# Patient Record
Sex: MALE | Race: WHITE | Employment: OTHER
[De-identification: names, ages, dates, MRNs, and addresses within clinical notes are randomized per-mention and may not be internally consistent; named-entity substitution may affect disease eponyms.]

---

## 2017-01-06 RX ORDER — RABEPRAZOLE SODIUM 20 MG/1
TABLET, DELAYED RELEASE ORAL
Qty: 90 TABLET | Refills: 1 | Status: SHIPPED | OUTPATIENT
Start: 2017-01-06 | End: 2017-07-05 | Stop reason: SDUPTHER

## 2017-02-22 ENCOUNTER — OFFICE VISIT (OUTPATIENT)
Dept: INTERNAL MEDICINE | Facility: CLINIC | Age: 82
End: 2017-02-22

## 2017-02-22 VITALS
TEMPERATURE: 97.5 F | SYSTOLIC BLOOD PRESSURE: 126 MMHG | WEIGHT: 180 LBS | HEIGHT: 72 IN | RESPIRATION RATE: 20 BRPM | DIASTOLIC BLOOD PRESSURE: 62 MMHG | BODY MASS INDEX: 24.38 KG/M2 | HEART RATE: 92 BPM

## 2017-02-22 DIAGNOSIS — J34.89 DRY NOSE: ICD-10-CM

## 2017-02-22 DIAGNOSIS — M54.2 NECK PAIN: Primary | ICD-10-CM

## 2017-02-22 PROCEDURE — 99213 OFFICE O/P EST LOW 20 MIN: CPT | Performed by: INTERNAL MEDICINE

## 2017-02-22 RX ORDER — ECHINACEA PURPUREA EXTRACT 125 MG
1 TABLET ORAL 4 TIMES DAILY
Qty: 1 BOTTLE | Refills: 0 | Status: SHIPPED | OUTPATIENT
Start: 2017-02-22 | End: 2017-05-11 | Stop reason: CLARIF

## 2017-02-22 RX ORDER — PREDNISONE 10 MG/1
10 TABLET ORAL 2 TIMES DAILY
Qty: 10 TABLET | Refills: 0 | Status: SHIPPED | OUTPATIENT
Start: 2017-02-22 | End: 2017-02-27

## 2017-03-06 ENCOUNTER — TELEPHONE (OUTPATIENT)
Dept: INTERNAL MEDICINE | Facility: CLINIC | Age: 82
End: 2017-03-06

## 2017-03-06 RX ORDER — PREDNISONE 10 MG/1
10 TABLET ORAL 3 TIMES DAILY
Qty: 15 TABLET | Refills: 0 | Status: SHIPPED | OUTPATIENT
Start: 2017-03-06 | End: 2017-03-11

## 2017-04-10 RX ORDER — PREDNISONE 10 MG/1
10 TABLET ORAL 3 TIMES DAILY
Qty: 15 TABLET | Refills: 0 | Status: SHIPPED | OUTPATIENT
Start: 2017-04-10 | End: 2017-04-15

## 2017-04-26 ENCOUNTER — TELEPHONE (OUTPATIENT)
Dept: INTERNAL MEDICINE CLINIC | Age: 82
End: 2017-04-26

## 2017-04-26 RX ORDER — AZITHROMYCIN 250 MG/1
TABLET, FILM COATED ORAL
Qty: 1 PACKET | Refills: 0 | Status: SHIPPED | OUTPATIENT
Start: 2017-04-26 | End: 2017-05-06

## 2017-04-26 RX ORDER — LORATADINE 10 MG/1
10 TABLET ORAL DAILY
Qty: 30 TABLET | Refills: 0 | Status: SHIPPED | OUTPATIENT
Start: 2017-04-26 | End: 2017-05-11 | Stop reason: CLARIF

## 2017-05-11 ENCOUNTER — OFFICE VISIT (OUTPATIENT)
Dept: INTERNAL MEDICINE CLINIC | Age: 82
End: 2017-05-11
Payer: COMMERCIAL

## 2017-05-11 VITALS
WEIGHT: 179.4 LBS | HEIGHT: 72 IN | RESPIRATION RATE: 16 BRPM | TEMPERATURE: 97.4 F | DIASTOLIC BLOOD PRESSURE: 64 MMHG | BODY MASS INDEX: 24.3 KG/M2 | SYSTOLIC BLOOD PRESSURE: 132 MMHG | HEART RATE: 84 BPM

## 2017-05-11 DIAGNOSIS — E78.00 HIGH CHOLESTEROL: ICD-10-CM

## 2017-05-11 DIAGNOSIS — I10 ESSENTIAL HYPERTENSION: Primary | ICD-10-CM

## 2017-05-11 DIAGNOSIS — E03.9 HYPOTHYROIDISM, UNSPECIFIED TYPE: ICD-10-CM

## 2017-05-11 PROCEDURE — 99214 OFFICE O/P EST MOD 30 MIN: CPT | Performed by: INTERNAL MEDICINE

## 2017-05-15 RX ORDER — LEVOTHYROXINE SODIUM 0.12 MG/1
TABLET ORAL
Qty: 90 TABLET | Refills: 0 | Status: SHIPPED | OUTPATIENT
Start: 2017-05-15 | End: 2017-08-13 | Stop reason: SDUPTHER

## 2017-05-16 ENCOUNTER — TELEPHONE (OUTPATIENT)
Dept: INTERNAL MEDICINE CLINIC | Age: 82
End: 2017-05-16

## 2017-05-16 DIAGNOSIS — E04.9 ENLARGED THYROID: Primary | ICD-10-CM

## 2017-05-19 RX ORDER — PRAVASTATIN SODIUM 20 MG
TABLET ORAL
Qty: 90 TABLET | Refills: 0 | Status: SHIPPED | OUTPATIENT
Start: 2017-05-19 | End: 2017-08-20 | Stop reason: SDUPTHER

## 2017-05-24 LAB
CHOLESTEROL, TOTAL: 181 MG/DL
CHOLESTEROL/HDL RATIO: 3.6
HDLC SERPL-MCNC: 50 MG/DL (ref 35–70)
LDL CHOLESTEROL CALCULATED: 107 MG/DL (ref 0–160)
TRIGL SERPL-MCNC: 119 MG/DL
VLDLC SERPL CALC-MCNC: 24 MG/DL

## 2017-05-25 DIAGNOSIS — E03.9 HYPOTHYROIDISM, UNSPECIFIED TYPE: ICD-10-CM

## 2017-05-25 DIAGNOSIS — I10 ESSENTIAL HYPERTENSION: ICD-10-CM

## 2017-05-25 DIAGNOSIS — E78.00 HIGH CHOLESTEROL: ICD-10-CM

## 2017-06-29 ENCOUNTER — TELEPHONE (OUTPATIENT)
Dept: INTERNAL MEDICINE CLINIC | Age: 82
End: 2017-06-29

## 2017-07-05 RX ORDER — RANITIDINE 150 MG/1
TABLET ORAL
Qty: 90 TABLET | Refills: 0 | Status: SHIPPED | OUTPATIENT
Start: 2017-07-05 | End: 2017-10-03 | Stop reason: SDUPTHER

## 2017-07-05 RX ORDER — RABEPRAZOLE SODIUM 20 MG/1
TABLET, DELAYED RELEASE ORAL
Qty: 90 TABLET | Refills: 0 | Status: SHIPPED | OUTPATIENT
Start: 2017-07-05 | End: 2017-10-03 | Stop reason: SDUPTHER

## 2017-08-01 ENCOUNTER — OFFICE VISIT (OUTPATIENT)
Dept: INTERNAL MEDICINE CLINIC | Age: 82
End: 2017-08-01
Payer: COMMERCIAL

## 2017-08-01 VITALS
BODY MASS INDEX: 23.35 KG/M2 | SYSTOLIC BLOOD PRESSURE: 137 MMHG | RESPIRATION RATE: 16 BRPM | HEART RATE: 100 BPM | DIASTOLIC BLOOD PRESSURE: 72 MMHG | WEIGHT: 172.4 LBS | HEIGHT: 72 IN | TEMPERATURE: 97.4 F

## 2017-08-01 DIAGNOSIS — E04.1 THYROID NODULE: ICD-10-CM

## 2017-08-01 DIAGNOSIS — G89.29 CHRONIC BILATERAL LOW BACK PAIN WITHOUT SCIATICA: ICD-10-CM

## 2017-08-01 DIAGNOSIS — M54.50 CHRONIC BILATERAL LOW BACK PAIN WITHOUT SCIATICA: ICD-10-CM

## 2017-08-01 DIAGNOSIS — E03.9 HYPOTHYROIDISM, UNSPECIFIED TYPE: ICD-10-CM

## 2017-08-01 DIAGNOSIS — Z00.00 ROUTINE PHYSICAL EXAMINATION: Primary | ICD-10-CM

## 2017-08-01 PROCEDURE — G0439 PPPS, SUBSEQ VISIT: HCPCS | Performed by: INTERNAL MEDICINE

## 2017-08-01 ASSESSMENT — LIFESTYLE VARIABLES
HAVE YOU OR SOMEONE ELSE BEEN INJURED AS A RESULT OF YOUR DRINKING: 0
HOW OFTEN DURING THE LAST YEAR HAVE YOU BEEN UNABLE TO REMEMBER WHAT HAPPENED THE NIGHT BEFORE BECAUSE YOU HAD BEEN DRINKING: 0
HAS A RELATIVE, FRIEND, DOCTOR, OR ANOTHER HEALTH PROFESSIONAL EXPRESSED CONCERN ABOUT YOUR DRINKING OR SUGGESTED YOU CUT DOWN: 0
AUDIT-C TOTAL SCORE: 1
AUDIT TOTAL SCORE: 1
HOW OFTEN DO YOU HAVE A DRINK CONTAINING ALCOHOL: 1
HOW OFTEN DURING THE LAST YEAR HAVE YOU FAILED TO DO WHAT WAS NORMALLY EXPECTED FROM YOU BECAUSE OF DRINKING: 0
HOW MANY STANDARD DRINKS CONTAINING ALCOHOL DO YOU HAVE ON A TYPICAL DAY: 0
HOW OFTEN DURING THE LAST YEAR HAVE YOU FOUND THAT YOU WERE NOT ABLE TO STOP DRINKING ONCE YOU HAD STARTED: 0
HOW OFTEN DO YOU HAVE SIX OR MORE DRINKS ON ONE OCCASION: 0
HOW OFTEN DURING THE LAST YEAR HAVE YOU HAD A FEELING OF GUILT OR REMORSE AFTER DRINKING: 0
HOW OFTEN DURING THE LAST YEAR HAVE YOU NEEDED AN ALCOHOLIC DRINK FIRST THING IN THE MORNING TO GET YOURSELF GOING AFTER A NIGHT OF HEAVY DRINKING: 0

## 2017-08-01 ASSESSMENT — PATIENT HEALTH QUESTIONNAIRE - PHQ9: SUM OF ALL RESPONSES TO PHQ QUESTIONS 1-9: 0

## 2017-08-01 ASSESSMENT — ANXIETY QUESTIONNAIRES: GAD7 TOTAL SCORE: 0

## 2017-08-04 LAB
CHOLESTEROL, TOTAL: 182 MG/DL
CHOLESTEROL/HDL RATIO: 4
HDLC SERPL-MCNC: 45 MG/DL (ref 35–70)
LDL CHOLESTEROL CALCULATED: 93 MG/DL (ref 0–160)
T4 FREE: 1.4
TRIGL SERPL-MCNC: 220 MG/DL
TSH SERPL DL<=0.05 MIU/L-ACNC: 0.18 UIU/ML
VLDLC SERPL CALC-MCNC: 44 MG/DL

## 2017-08-14 RX ORDER — LEVOTHYROXINE SODIUM 0.12 MG/1
TABLET ORAL
Qty: 90 TABLET | Refills: 1 | Status: SHIPPED | OUTPATIENT
Start: 2017-08-14 | End: 2018-05-30 | Stop reason: CLARIF

## 2017-08-15 DIAGNOSIS — E03.9 HYPOTHYROIDISM, UNSPECIFIED TYPE: ICD-10-CM

## 2017-08-15 DIAGNOSIS — E04.1 THYROID NODULE: ICD-10-CM

## 2017-08-15 DIAGNOSIS — G89.29 CHRONIC BILATERAL LOW BACK PAIN WITHOUT SCIATICA: ICD-10-CM

## 2017-08-15 DIAGNOSIS — Z00.00 ROUTINE PHYSICAL EXAMINATION: ICD-10-CM

## 2017-08-15 DIAGNOSIS — M54.50 CHRONIC BILATERAL LOW BACK PAIN WITHOUT SCIATICA: ICD-10-CM

## 2017-08-16 ENCOUNTER — TELEPHONE (OUTPATIENT)
Dept: INTERNAL MEDICINE CLINIC | Age: 82
End: 2017-08-16

## 2017-08-16 DIAGNOSIS — E03.9 HYPOTHYROIDISM, UNSPECIFIED TYPE: Primary | ICD-10-CM

## 2017-08-21 RX ORDER — PRAVASTATIN SODIUM 20 MG
TABLET ORAL
Qty: 90 TABLET | Refills: 1 | Status: SHIPPED | OUTPATIENT
Start: 2017-08-21 | End: 2018-02-17 | Stop reason: SDUPTHER

## 2017-10-03 RX ORDER — RABEPRAZOLE SODIUM 20 MG/1
TABLET, DELAYED RELEASE ORAL
Qty: 90 TABLET | Refills: 1 | Status: SHIPPED | OUTPATIENT
Start: 2017-10-03 | End: 2018-04-01 | Stop reason: SDUPTHER

## 2017-10-03 RX ORDER — RANITIDINE 150 MG/1
TABLET ORAL
Qty: 90 TABLET | Refills: 1 | Status: SHIPPED | OUTPATIENT
Start: 2017-10-03 | End: 2018-04-01 | Stop reason: SDUPTHER

## 2017-11-16 ENCOUNTER — OFFICE VISIT (OUTPATIENT)
Dept: INTERNAL MEDICINE CLINIC | Age: 82
End: 2017-11-16
Payer: COMMERCIAL

## 2017-11-16 VITALS
SYSTOLIC BLOOD PRESSURE: 128 MMHG | DIASTOLIC BLOOD PRESSURE: 74 MMHG | WEIGHT: 175.6 LBS | TEMPERATURE: 98.6 F | OXYGEN SATURATION: 97 % | HEIGHT: 72 IN | BODY MASS INDEX: 23.78 KG/M2 | HEART RATE: 102 BPM | RESPIRATION RATE: 14 BRPM

## 2017-11-16 DIAGNOSIS — I10 ESSENTIAL HYPERTENSION: ICD-10-CM

## 2017-11-16 DIAGNOSIS — M54.50 CHRONIC BILATERAL LOW BACK PAIN WITHOUT SCIATICA: ICD-10-CM

## 2017-11-16 DIAGNOSIS — G89.29 CHRONIC BILATERAL LOW BACK PAIN WITHOUT SCIATICA: ICD-10-CM

## 2017-11-16 DIAGNOSIS — E03.9 HYPOTHYROIDISM, UNSPECIFIED TYPE: Primary | ICD-10-CM

## 2017-11-16 PROCEDURE — 99214 OFFICE O/P EST MOD 30 MIN: CPT | Performed by: INTERNAL MEDICINE

## 2017-11-16 NOTE — PROGRESS NOTES
Past Surgical History:   Procedure Laterality Date    BACK SURGERY      HERNIA REPAIR      JOINT REPLACEMENT Bilateral knees    SHOULDER SURGERY Right     THYROIDECTOMY      ULNAR TUNNEL RELEASE      UPPER GASTROINTESTINAL ENDOSCOPY  7/11/14       Family History   Problem Relation Age of Onset    Cancer Father      ROS   Constitutional:  Negative for fatigue, loss of appetite and unexpected weight change   HEENT            : Negative for neck stiffness and pain, no congestion or sinus pressure   Eyes                : No visual disturbance or pain   Cardiovascular: No chest pain or palpitations or leg swelling   Respiratory      : Negative for cough, shortness of breath or wheezing   Gastrointestinal: Negative for abdominal pain, constipation or diarrhea and bloating No nausea or vomiting   Genitourinary:     No urgency or frequency, no burning or hematuria   Musculoskeletal: Positive for arthralgias, back pain or myalgias   Skin                  : Negative for rash or erythema   Neurological    : Negative for dizziness, weakness, tremors ,light headedness or syncope   Psychiatric       : Negative for dysphoric mood, sleep disturbances, nervous or anxious, or decreased concentration   All other review of systems was negative    Objective  Physical Examination:    Nursing note reviewed    /74 (Site: Left Arm, Position: Sitting, Cuff Size: Medium Adult)   Pulse 102   Temp 98.6 °F (37 °C) (Oral)   Resp 14   Ht 6' 0.01\" (1.829 m)   Wt 175 lb 9.6 oz (79.7 kg)   SpO2 97%   BMI 23.81 kg/m²   BP Readings from Last 3 Encounters:   11/16/17 128/74   08/01/17 137/72   05/11/17 132/64         Constitutional:  Lakshmi Alberto is oriented to place, person and time ,appears well-developed and well-nourished  HEENT:  Atraumatic and normocephalic, external ears normal bilaterally, nose normal no oropharyngeal exudate and is clear and moist  Eyes:  EOCM normal; conjunctivae normal; PERRLA patient's Synthroid was decreased to 100 µg from 125 µg and will be getting labs done at the end of December  Blood pressure stable on current medications and patient is seen cardiology for aortic stenosis and continue current management conservatively and patient is also seeing in nephrology for his chronic renal failure and urology for his prostate carcinoma in our office notes reviewed  Review in 6 months           1. Severa Ingle received counseling on the following healthy behaviors: nutrition and exercise    2. Prior labs and health maintenance reviewed. 3.  Discussed use, benefit, and side effects of prescribed medications. Barriers to medication compliance addressed. All his questions were answered. Pt voiced understanding. Severa Ingle will continue current medications, diet and exercise. No orders of the defined types were placed in this encounter. Completed Refills               Requested Prescriptions      No prescriptions requested or ordered in this encounter     4. Patient given educational materials - see patient instructions    5. Was a self-tracking handout given in paper form or via [employer]? NO    No orders of the defined types were placed in this encounter. Return in about 6 months (around 5/16/2018). Patient voiced understanding and agreed to treatment plan. Electronically signed by Anabela Hearn MD on 11/16/2017 at 3:12 PM    This note is created with a voice recognition program and while intend to generate a document that accurately reflects the content of the visit, no guarantee can be provided that every mistake has been identified and corrected by editing.

## 2017-11-27 RX ORDER — MELOXICAM 15 MG/1
TABLET ORAL
Qty: 90 TABLET | Refills: 1 | Status: SHIPPED | OUTPATIENT
Start: 2017-11-27 | End: 2018-05-30 | Stop reason: SDUPTHER

## 2018-02-02 ENCOUNTER — TELEPHONE (OUTPATIENT)
Dept: INTERNAL MEDICINE CLINIC | Age: 83
End: 2018-02-02

## 2018-02-19 RX ORDER — PRAVASTATIN SODIUM 20 MG
TABLET ORAL
Qty: 90 TABLET | Refills: 1 | Status: SHIPPED | OUTPATIENT
Start: 2018-02-19 | End: 2018-08-16 | Stop reason: SDUPTHER

## 2018-04-03 RX ORDER — RABEPRAZOLE SODIUM 20 MG/1
TABLET, DELAYED RELEASE ORAL
Qty: 90 TABLET | Refills: 0 | Status: SHIPPED | OUTPATIENT
Start: 2018-04-03 | End: 2018-07-02 | Stop reason: SDUPTHER

## 2018-04-03 RX ORDER — RANITIDINE 150 MG/1
TABLET ORAL
Qty: 90 TABLET | Refills: 0 | Status: SHIPPED | OUTPATIENT
Start: 2018-04-03 | End: 2018-07-02 | Stop reason: SDUPTHER

## 2018-05-30 ENCOUNTER — OFFICE VISIT (OUTPATIENT)
Dept: INTERNAL MEDICINE CLINIC | Age: 83
End: 2018-05-30
Payer: COMMERCIAL

## 2018-05-30 VITALS
BODY MASS INDEX: 23.16 KG/M2 | TEMPERATURE: 98.5 F | WEIGHT: 170.8 LBS | SYSTOLIC BLOOD PRESSURE: 122 MMHG | DIASTOLIC BLOOD PRESSURE: 80 MMHG | HEART RATE: 84 BPM

## 2018-05-30 DIAGNOSIS — G89.29 CHRONIC BILATERAL LOW BACK PAIN WITHOUT SCIATICA: ICD-10-CM

## 2018-05-30 DIAGNOSIS — E78.00 HIGH CHOLESTEROL: ICD-10-CM

## 2018-05-30 DIAGNOSIS — I10 ESSENTIAL HYPERTENSION: Primary | ICD-10-CM

## 2018-05-30 DIAGNOSIS — M54.50 CHRONIC BILATERAL LOW BACK PAIN WITHOUT SCIATICA: ICD-10-CM

## 2018-05-30 PROCEDURE — 99214 OFFICE O/P EST MOD 30 MIN: CPT | Performed by: INTERNAL MEDICINE

## 2018-05-30 RX ORDER — FUROSEMIDE 20 MG/1
1 TABLET ORAL DAILY
COMMUNITY
Start: 2018-04-06 | End: 2019-09-17

## 2018-05-30 RX ORDER — TRAMADOL HYDROCHLORIDE 50 MG/1
1 TABLET ORAL EVERY 8 HOURS PRN
COMMUNITY
Start: 2018-04-27

## 2018-05-30 RX ORDER — MELOXICAM 15 MG/1
TABLET ORAL
Qty: 90 TABLET | Refills: 1 | Status: ON HOLD | OUTPATIENT
Start: 2018-05-30 | End: 2019-09-17 | Stop reason: ALTCHOICE

## 2018-05-30 RX ORDER — LEVOTHYROXINE SODIUM 0.1 MG/1
100 TABLET ORAL DAILY
COMMUNITY
End: 2019-04-08 | Stop reason: SDUPTHER

## 2018-05-30 ASSESSMENT — PATIENT HEALTH QUESTIONNAIRE - PHQ9
2. FEELING DOWN, DEPRESSED OR HOPELESS: 0
SUM OF ALL RESPONSES TO PHQ QUESTIONS 1-9: 0
SUM OF ALL RESPONSES TO PHQ9 QUESTIONS 1 & 2: 0
1. LITTLE INTEREST OR PLEASURE IN DOING THINGS: 0

## 2018-05-31 ENCOUNTER — TELEPHONE (OUTPATIENT)
Dept: INTERNAL MEDICINE CLINIC | Age: 83
End: 2018-05-31

## 2018-05-31 DIAGNOSIS — E03.9 HYPOTHYROIDISM, UNSPECIFIED TYPE: ICD-10-CM

## 2018-07-03 RX ORDER — RANITIDINE 150 MG/1
TABLET ORAL
Qty: 90 TABLET | Refills: 1 | Status: SHIPPED | OUTPATIENT
Start: 2018-07-03 | End: 2018-12-30 | Stop reason: SDUPTHER

## 2018-07-03 RX ORDER — RABEPRAZOLE SODIUM 20 MG/1
TABLET, DELAYED RELEASE ORAL
Qty: 90 TABLET | Refills: 1 | Status: SHIPPED | OUTPATIENT
Start: 2018-07-03 | End: 2018-12-30 | Stop reason: SDUPTHER

## 2018-08-08 DIAGNOSIS — I10 ESSENTIAL HYPERTENSION: ICD-10-CM

## 2018-08-08 DIAGNOSIS — E78.00 HIGH CHOLESTEROL: ICD-10-CM

## 2018-08-16 RX ORDER — PRAVASTATIN SODIUM 20 MG
TABLET ORAL
Qty: 90 TABLET | Refills: 1 | Status: SHIPPED | OUTPATIENT
Start: 2018-08-16 | End: 2019-02-12 | Stop reason: SDUPTHER

## 2018-12-05 ENCOUNTER — OFFICE VISIT (OUTPATIENT)
Dept: INTERNAL MEDICINE CLINIC | Age: 83
End: 2018-12-05
Payer: COMMERCIAL

## 2018-12-05 VITALS
HEART RATE: 93 BPM | DIASTOLIC BLOOD PRESSURE: 80 MMHG | RESPIRATION RATE: 16 BRPM | BODY MASS INDEX: 23.03 KG/M2 | SYSTOLIC BLOOD PRESSURE: 132 MMHG | OXYGEN SATURATION: 98 % | HEIGHT: 72 IN | WEIGHT: 170 LBS

## 2018-12-05 DIAGNOSIS — E78.00 HIGH CHOLESTEROL: ICD-10-CM

## 2018-12-05 DIAGNOSIS — E03.9 HYPOTHYROIDISM, UNSPECIFIED TYPE: ICD-10-CM

## 2018-12-05 DIAGNOSIS — I10 ESSENTIAL HYPERTENSION: Primary | ICD-10-CM

## 2018-12-05 PROCEDURE — 99214 OFFICE O/P EST MOD 30 MIN: CPT | Performed by: INTERNAL MEDICINE

## 2018-12-05 NOTE — PROGRESS NOTES
HYPERTENSION visit     BP Readings from Last 3 Encounters:   05/30/18 122/80   11/16/17 128/74   08/01/17 137/72       LDL Calculated (mg/dL)   Date Value   08/04/2017 93     HDL (mg/dL)   Date Value   08/04/2017 45     BUN (mg/dL)   Date Value   05/27/2015 40 (H)     CREATININE (mg/dL)   Date Value   05/27/2015 2.07 (H)     Glucose (mg/dL)   Date Value   05/27/2015 105 (H)              Have you changed or started any medications since your last visit including any over-the-counter medicines, vitamins, or herbal medicines? no   Have you stopped taking any of your medications? Is so, why? -  no  Are you having any side effects from any of your medications? - no  How often do you miss doses of your medication? no      Have you seen any other physician or provider since your last visit?  no   Have you had any other diagnostic tests since your last visit?  no   Have you been seen in the emergency room and/or had an admission in a hospital since we last saw you?  no   Have you had your routine dental cleaning in the past 6 months?  yes -      Do you have an active MyChart account? If no, what is the barrier?   Yes    Patient Care Team:  Moni Licea MD as PCP - General (Internal Medicine)  Danay Gray MD as Consulting Physician (Urology)  Amena Hackett MD as Consulting Physician (Nephrology)  Abundio Valadez MD as Consulting Physician (Cardiology)  Jose Luis Pickett MD as Consulting Physician (Neurosurgery)  Sanjay Freire MD (Anesthesiology)    Medical History Review  Past Medical, Family, and Social History reviewed and does not contribute to the patient presenting condition    Health Maintenance   Topic Date Due    DTaP/Tdap/Td vaccine (1 - Tdap) 05[birth year]/1948    Shingles Vaccine (1 of 2 - 2 Dose Series) 07/01/2014    TSH testing  08/04/2018    Potassium monitoring  08/04/2018    Creatinine monitoring  08/04/2018    Flu vaccine (1) 09/01/2018    Pneumococcal low/med risk  Completed

## 2018-12-31 RX ORDER — RANITIDINE 150 MG/1
TABLET ORAL
Qty: 90 TABLET | Refills: 1 | Status: SHIPPED | OUTPATIENT
Start: 2018-12-31 | End: 2019-10-15 | Stop reason: ALTCHOICE

## 2018-12-31 RX ORDER — RABEPRAZOLE SODIUM 20 MG/1
TABLET, DELAYED RELEASE ORAL
Qty: 90 TABLET | Refills: 1 | Status: SHIPPED | OUTPATIENT
Start: 2018-12-31 | End: 2019-06-29 | Stop reason: SDUPTHER

## 2019-02-12 RX ORDER — PRAVASTATIN SODIUM 20 MG
TABLET ORAL
Qty: 90 TABLET | Refills: 1 | Status: SHIPPED | OUTPATIENT
Start: 2019-02-12 | End: 2019-08-11 | Stop reason: SDUPTHER

## 2019-02-25 ENCOUNTER — TELEPHONE (OUTPATIENT)
Dept: INTERNAL MEDICINE CLINIC | Age: 84
End: 2019-02-25

## 2019-03-21 ENCOUNTER — OFFICE VISIT (OUTPATIENT)
Dept: INTERNAL MEDICINE CLINIC | Age: 84
End: 2019-03-21
Payer: COMMERCIAL

## 2019-03-21 VITALS
HEIGHT: 72 IN | RESPIRATION RATE: 18 BRPM | BODY MASS INDEX: 23.19 KG/M2 | DIASTOLIC BLOOD PRESSURE: 84 MMHG | SYSTOLIC BLOOD PRESSURE: 122 MMHG | WEIGHT: 171.2 LBS | HEART RATE: 68 BPM | TEMPERATURE: 97.9 F

## 2019-03-21 DIAGNOSIS — I35.0 AORTIC VALVE STENOSIS, ETIOLOGY OF CARDIAC VALVE DISEASE UNSPECIFIED: ICD-10-CM

## 2019-03-21 DIAGNOSIS — Z86.002 HISTORY OF CARCINOMA IN SITU OF PROSTATE: ICD-10-CM

## 2019-03-21 DIAGNOSIS — N18.2 CHRONIC RENAL FAILURE, STAGE 2 (MILD): ICD-10-CM

## 2019-03-21 DIAGNOSIS — E03.9 HYPOTHYROIDISM, UNSPECIFIED TYPE: Primary | ICD-10-CM

## 2019-03-21 PROCEDURE — 99214 OFFICE O/P EST MOD 30 MIN: CPT | Performed by: INTERNAL MEDICINE

## 2019-03-21 ASSESSMENT — PATIENT HEALTH QUESTIONNAIRE - PHQ9
SUM OF ALL RESPONSES TO PHQ9 QUESTIONS 1 & 2: 0
SUM OF ALL RESPONSES TO PHQ QUESTIONS 1-9: 0
2. FEELING DOWN, DEPRESSED OR HOPELESS: 0
1. LITTLE INTEREST OR PLEASURE IN DOING THINGS: 0
SUM OF ALL RESPONSES TO PHQ QUESTIONS 1-9: 0

## 2019-03-29 LAB
T4 FREE: 0.91
TSH SERPL DL<=0.05 MIU/L-ACNC: 1.91 UIU/ML

## 2019-04-02 DIAGNOSIS — E03.9 HYPOTHYROIDISM, UNSPECIFIED TYPE: ICD-10-CM

## 2019-04-06 ENCOUNTER — PATIENT MESSAGE (OUTPATIENT)
Dept: INTERNAL MEDICINE CLINIC | Age: 84
End: 2019-04-06

## 2019-04-08 RX ORDER — LEVOTHYROXINE SODIUM 0.1 MG/1
100 TABLET ORAL DAILY
Qty: 90 TABLET | Refills: 1 | Status: SHIPPED | OUTPATIENT
Start: 2019-04-08 | End: 2019-11-04 | Stop reason: SDUPTHER

## 2019-04-08 NOTE — TELEPHONE ENCOUNTER
Dr Nasreen Beebe please review labs and advise if change of thyroid med is needed.     Thanks, Neena Butt

## 2019-04-08 NOTE — TELEPHONE ENCOUNTER
From: Tremayne Bustos  To: Meaghan Maradiaga MD  Sent: 4/6/2019 7:35 AM EDT  Subject: Prescription Question    Need new thyroid prescription based on new lab results. Thanks.

## 2019-04-10 ENCOUNTER — TELEPHONE (OUTPATIENT)
Dept: INTERNAL MEDICINE CLINIC | Age: 84
End: 2019-04-10

## 2019-06-05 ENCOUNTER — OFFICE VISIT (OUTPATIENT)
Dept: INTERNAL MEDICINE CLINIC | Age: 84
End: 2019-06-05
Payer: COMMERCIAL

## 2019-06-05 VITALS
RESPIRATION RATE: 16 BRPM | HEIGHT: 72 IN | HEART RATE: 115 BPM | WEIGHT: 167 LBS | BODY MASS INDEX: 22.62 KG/M2 | SYSTOLIC BLOOD PRESSURE: 127 MMHG | OXYGEN SATURATION: 99 % | DIASTOLIC BLOOD PRESSURE: 82 MMHG

## 2019-06-05 DIAGNOSIS — E03.9 HYPOTHYROIDISM, UNSPECIFIED TYPE: ICD-10-CM

## 2019-06-05 DIAGNOSIS — I35.0 AORTIC VALVE STENOSIS, ETIOLOGY OF CARDIAC VALVE DISEASE UNSPECIFIED: Primary | ICD-10-CM

## 2019-06-05 DIAGNOSIS — I10 ESSENTIAL HYPERTENSION: ICD-10-CM

## 2019-06-05 DIAGNOSIS — N18.2 CHRONIC RENAL FAILURE, STAGE 2 (MILD): ICD-10-CM

## 2019-06-05 DIAGNOSIS — C61 PROSTATE CARCINOMA (HCC): ICD-10-CM

## 2019-06-05 PROCEDURE — 99214 OFFICE O/P EST MOD 30 MIN: CPT | Performed by: INTERNAL MEDICINE

## 2019-06-05 NOTE — PROGRESS NOTES
Gómez Casanova is a 80 y.o. male who presents for   Chief Complaint   Patient presents with    Hypertension     6 month follow, states he is having valve replacement next week, wants to talk to provider about surgery      and follow up of chronic medical problems. Patient Active Problem List   Diagnosis    HTN (hypertension)    High cholesterol    Hypothyroid    CRF (chronic renal failure)    Gastroenteritis    ARF (acute renal failure) (HCC)     HPI  Here for follow-up on blood pressure and wants to discuss about his upcoming surgery    Current Outpatient Medications   Medication Sig Dispense Refill    levothyroxine (SYNTHROID) 100 MCG tablet Take 1 tablet by mouth Daily 90 tablet 1    pravastatin (PRAVACHOL) 20 MG tablet TAKE 1 TABLET DAILY 90 tablet 1    RABEprazole (ACIPHEX) 20 MG tablet TAKE 1 TABLET DAILY 90 tablet 1    ranitidine (ZANTAC) 150 MG tablet TAKE 1 TABLET DAILY 90 tablet 1    traMADol (ULTRAM) 50 MG tablet Take 1 tablet by mouth as needed. Ora Tovar furosemide (LASIX) 20 MG tablet Take 1 tablet by mouth daily      meloxicam (MOBIC) 15 MG tablet TAKE ONE TABLET BY MOUTH DAILY AS NEEDED 90 tablet 1    tiZANidine (ZANAFLEX) 2 MG tablet Take 2 mg by mouth every 6 hours as needed      amLODIPine (NORVASC) 2.5 MG tablet Take 1 tablet by mouth daily      tamsulosin (FLOMAX) 0.4 MG capsule Take 1 capsule by mouth daily.  lisinopril (PRINIVIL;ZESTRIL) 20 MG tablet Take 20 mg by mouth daily.  aspirin 81 MG tablet Take 81 mg by mouth daily.  acetaminophen (TYLENOL) 500 MG tablet Take 500 mg by mouth every 6 hours as needed. No current facility-administered medications for this visit.         Allergies   Allergen Reactions    Bactrim [Sulfamethoxazole-Trimethoprim]     Levofloxacin     Other      Unknown antibiotic    Tetracyclines & Related        Past Medical History:   Diagnosis Date    [facility]    Dr. Salvador Organ Chronic back pain     CRF (chronic renal failure)     Hypertension     Thyroid disease        Past Surgical History:   Procedure Laterality Date    BACK SURGERY      HERNIA REPAIR      JOINT REPLACEMENT Bilateral knees    SHOULDER SURGERY Right     THYROIDECTOMY      ULNAR TUNNEL RELEASE      UPPER GASTROINTESTINAL ENDOSCOPY  7/11/14       Family History   Problem Relation Age of Onset    Cancer Father      ROS   Constitutional:  Negative for fatigue, loss of appetite and unexpected weight change   HEENT            : Negative for neck stiffness and pain, no congestion or sinus pressure   Eyes                : No visual disturbance or pain   Cardiovascular: No chest pain or palpitations or leg swelling   Respiratory      : Negative for cough, positive for shortness of breath on exertion but no wheezing   Gastrointestinal: Negative for abdominal pain, constipation or diarrhea and bloating No nausea or vomiting   Genitourinary:     No urgency or frequency, no burning or hematuria   Musculoskeletal: Positive for arthralgias, back pain or myalgias   Skin                  : Negative for rash or erythema   Neurological    : Negative for dizziness, weakness, tremors ,light headedness or syncope   Psychiatric       : Negative for dysphoric mood, sleep disturbances, nervous or anxious, or decreased concentration   All other review of systems was negative    Objective  Physical Examination:    Nursing note reviewed    /82   Pulse 115   Resp 16   Ht 6' 0.01\" (1.829 m)   Wt 167 lb (75.8 kg)   SpO2 99%   BMI 22.64 kg/m²   BP Readings from Last 3 Encounters:   06/05/19 127/82   03/21/19 122/84   12/05/18 132/80         Constitutional:  Adali Phoenix is oriented to place, person and time ,appears well-developed and well-nourished  HEENT:  Atraumatic and normocephalic, external ears normal bilaterally, nose normal no oropharyngeal exudate and is clear and moist  Eyes:  EOCM normal; conjunctivae normal; PERRLA bilaterally  Neck:  Normal range of motion, neck supple, no JVD and no thyromegaly  Cardiovascular:  RRR, normal heart sounds and intact distal pulses  Pulmonary:  effort normal and breath sounds normal bilaterally,no wheezes or rales, no respiratory distress  Abdominal:  Soft, non-tender; normal bowel sounds, no masses  Musculoskeletal:  Limited range of motion and no edema or tenderness bilaterally  No lymphadenopathy  Neurological:  alert, oriented, and normal reflexes bilaterally  Skin: warm and dry  Psychiatric:  normal mood and effect; behavior normal.    Labs:   No results found for: LABA1C  Lab Results   Component Value Date    CHOL 182 08/04/2017     Lab Results   Component Value Date    HDL 45 08/04/2017     Lab Results   Component Value Date    LDLCALC 93 08/04/2017     Lab Results   Component Value Date    TRIG 220 08/04/2017     No components found for: [place], [place]  Lab Results   Component Value Date    WBC 11.8 (H) 05/27/2015    HGB 9.3 (L) 05/27/2015    HCT 28.8 (L) 05/27/2015    MCV 91.7 05/27/2015     05/27/2015     Lab Results   Component Value Date    INR 1.0 07/10/2014    PROTIME 10.2 07/10/2014     Lab Results   Component Value Date    GLUCOSE 105 (H) 05/27/2015    CREATININE 2.07 (H) 05/27/2015    BUN 40 (H) 05/27/2015     05/27/2015    K 4.6 05/27/2015     05/27/2015    CO2 20 05/27/2015     Lab Results   Component Value Date    ALT 39 03/24/2015    AST 20 03/24/2015    ALKPHOS 71 03/24/2015    BILITOT 0.7 03/24/2015     Lab Results   Component Value Date    LABALBU 3.8 03/24/2015     Lab Results   Component Value Date    TSH 1.91 03/29/2019     Assessment:  1. Aortic valve stenosis, etiology of cardiac valve disease unspecified    2. Essential hypertension    3. Hypothyroidism, unspecified type    4. Chronic renal failure, stage 2 (mild)    5.  Prostate carcinoma [facility])        Plan:  Patient visited the cardiothoracic surgeon and plan for aortic valve replacement for aortic valve stenosis and regurgitation by TAVR method and patient has been seen the cardiologist  Patient's blood pressure stable on current medications  Patient's last TSH was within normal limits and will continue current treatment  Patient says that he has seen the urologist and is PSA started increasing and patient was treated for a recurrent carcinoma of the prostate and started on medications Casodex but patient could not tolerate and so patient was started on a different medication and has not started yet. And is waiting for the approval which was happened only 2 days back and waiting for the medication  Patient's creatinine increased to 2.23 and office notes from nephrology reviewed  Review in 6 months           1. Bryan Ortiz received counseling on the following healthy behaviors: nutrition and exercise    2. Prior labs and health maintenance reviewed. 3.  Discussed use, benefit, and side effects of prescribed medications. Barriers to medication compliance addressed. All his questions were answered. Pt voiced understanding. Bryan Ortiz will continue current medications, diet and exercise. No orders of the defined types were placed in this encounter. Completed Refills               Requested Prescriptions      No prescriptions requested or ordered in this encounter     4. Patient given educational materials - see patient instructions    5. Was a self-tracking handout given in paper form or via HelloTel? NO    No orders of the defined types were placed in this encounter. Return in about 6 months (around 12/5/2019). Patient voiced understanding and agreed to treatment plan. Electronically signed by Markus Alegria MD on 6/5/2019 at 1:43 PM    This note is created with a voice recognition program and while intend to generate a document that accurately reflects the content of the visit, no guarantee can be provided that every mistake has been identified and corrected by editing.

## 2019-06-05 NOTE — PROGRESS NOTES
Visit Information    Have you changed or started any medications since your last visit including any over-the-counter medicines, vitamins, or herbal medicines? no   Are you having any side effects from any of your medications? -  no  Have you stopped taking any of your medications? Is so, why? -  no    Have you seen any other physician or provider since your last visit? Yes - Records Obtained  Have you had any other diagnostic tests since your last visit? No  Have you been seen in the emergency room and/or had an admission to a hospital since we last saw you? No  Have you had your routine dental cleaning in the past 6 months? no    Have you activated your [employer] account? If not, what are your barriers?  Yes     Patient Care Team:  Sam Claery MD as PCP - General (Internal Medicine)  Sam Cleary MD as PCP - [facility]  Krzysztof Rojo MD as Consulting Physician (Urology)  Reji Aburto MD as Consulting Physician (Nephrology)  Kadie Rea MD as Consulting Physician (Cardiology)  Lori Richardson MD as Consulting Physician (Neurosurgery)  Becky Stoddard MD (Anesthesiology)    Medical History Review  Past Medical, Family, and Social History reviewed and does not contribute to the patient presenting condition    Health Maintenance   Topic Date Due    DTaP/Tdap/Td vaccine (1 - Tdap) 05/23/1948    Shingles Vaccine (2 of 3) 01/30/2017    Potassium monitoring  08/04/2018    Creatinine monitoring  08/04/2018    TSH testing  03/29/2020    Flu vaccine  Completed    Pneumococcal 65+ years Vaccine  Completed

## 2019-07-01 RX ORDER — RABEPRAZOLE SODIUM 20 MG/1
TABLET, DELAYED RELEASE ORAL
Qty: 90 TABLET | Refills: 1 | Status: ON HOLD | OUTPATIENT
Start: 2019-07-01 | End: 2019-09-18 | Stop reason: SDUPTHER

## 2019-08-01 ENCOUNTER — TELEPHONE (OUTPATIENT)
Dept: INTERNAL MEDICINE CLINIC | Age: 84
End: 2019-08-01

## 2019-08-12 RX ORDER — PRAVASTATIN SODIUM 20 MG
TABLET ORAL
Qty: 90 TABLET | Refills: 1 | Status: SHIPPED | OUTPATIENT
Start: 2019-08-12 | End: 2020-02-10

## 2019-08-13 ENCOUNTER — TELEPHONE (OUTPATIENT)
Dept: INTERNAL MEDICINE CLINIC | Age: 84
End: 2019-08-13

## 2019-08-14 RX ORDER — AZITHROMYCIN 250 MG/1
250 TABLET, FILM COATED ORAL SEE ADMIN INSTRUCTIONS
Qty: 6 TABLET | Refills: 0 | Status: SHIPPED | OUTPATIENT
Start: 2019-08-14 | End: 2019-08-19

## 2019-08-28 ENCOUNTER — TELEPHONE (OUTPATIENT)
Dept: INTERNAL MEDICINE CLINIC | Age: 84
End: 2019-08-28

## 2019-08-28 DIAGNOSIS — D64.9 ANEMIA, UNSPECIFIED TYPE: Primary | ICD-10-CM

## 2019-08-28 NOTE — TELEPHONE ENCOUNTER
Pt called for results of labs done 8/9/19 (in care everywhere).   Pt is concerned about the CBC he viewed on the patient portal.

## 2019-09-16 ENCOUNTER — TELEPHONE (OUTPATIENT)
Dept: INTERNAL MEDICINE CLINIC | Age: 84
End: 2019-09-16

## 2019-09-16 DIAGNOSIS — R53.1 WEAKNESS: Primary | ICD-10-CM

## 2019-09-16 NOTE — TELEPHONE ENCOUNTER
Pt called stating he has symptoms of anemia- fatigue, weak, almost passed out last week. said he looked at labs from [facility] and is anemic    Labs 9/12/19 at [facility], called for reports and scanned

## 2019-09-17 ENCOUNTER — HOSPITAL ENCOUNTER (INPATIENT)
Age: 84
LOS: 2 days | Discharge: HOME | DRG: 812 | End: 2019-09-19
Attending: EMERGENCY MEDICINE | Admitting: FAMILY MEDICINE
Payer: COMMERCIAL

## 2019-09-17 DIAGNOSIS — D64.9 ANEMIA, UNSPECIFIED TYPE: Primary | ICD-10-CM

## 2019-09-17 DIAGNOSIS — N17.9 AKI (ACUTE KIDNEY INJURY) (HCC): ICD-10-CM

## 2019-09-17 DIAGNOSIS — D64.9 ANEMIA, UNSPECIFIED TYPE: ICD-10-CM

## 2019-09-17 PROBLEM — Z95.2 S/P TAVR (TRANSCATHETER AORTIC VALVE REPLACEMENT): Status: ACTIVE | Noted: 2019-09-17

## 2019-09-17 PROBLEM — I12.9 CKD STAGE 4 SECONDARY TO HYPERTENSION (HCC): Status: ACTIVE | Noted: 2019-09-17

## 2019-09-17 PROBLEM — N18.4 CKD STAGE 4 SECONDARY TO HYPERTENSION (HCC): Status: ACTIVE | Noted: 2019-09-17

## 2019-09-17 LAB
ABSOLUTE EOS #: 0.13 K/UL (ref 0–0.44)
ABSOLUTE IMMATURE GRANULOCYTE: 0.03 K/UL (ref 0–0.3)
ABSOLUTE LYMPH #: 1.57 K/UL (ref 1.1–3.7)
ABSOLUTE MONO #: 0.76 K/UL (ref 0.1–1.2)
ABSOLUTE RETIC #: 0.05 M/UL (ref 0.03–0.08)
ANION GAP SERPL CALCULATED.3IONS-SCNC: 11 MMOL/L (ref 9–17)
BASOPHILS # BLD: 1 % (ref 0–2)
BASOPHILS ABSOLUTE: 0.03 K/UL (ref 0–0.2)
BASOPHILS ABSOLUTE: ABNORMAL /ΜL
BASOPHILS RELATIVE PERCENT: ABNORMAL %
BUN BLDV-MCNC: 34 MG/DL (ref 8–23)
BUN/CREAT BLD: 18 (ref 9–20)
CALCIUM SERPL-MCNC: 8.7 MG/DL (ref 8.6–10.4)
CHLORIDE BLD-SCNC: 108 MMOL/L (ref 98–107)
CO2: 22 MMOL/L (ref 20–31)
CREAT SERPL-MCNC: 1.86 MG/DL (ref 0.7–1.2)
DATE, STOOL #1: NORMAL
DATE, STOOL #2: NORMAL
DATE, STOOL #3: NORMAL
DIFFERENTIAL TYPE: ABNORMAL
EOSINOPHILS ABSOLUTE: ABNORMAL /ΜL
EOSINOPHILS RELATIVE PERCENT: 2 % (ref 1–4)
EOSINOPHILS RELATIVE PERCENT: ABNORMAL %
FERRITIN: 72 UG/L (ref 30–400)
FOLATE: >20 NG/ML
GFR AFRICAN AMERICAN: 42 ML/MIN
GFR NON-AFRICAN AMERICAN: 34 ML/MIN
GFR SERPL CREATININE-BSD FRML MDRD: ABNORMAL ML/MIN/{1.73_M2}
GFR SERPL CREATININE-BSD FRML MDRD: ABNORMAL ML/MIN/{1.73_M2}
GLUCOSE BLD-MCNC: 115 MG/DL (ref 70–99)
HCT VFR BLD CALC: 22.9 % (ref 41–53)
HCT VFR BLD CALC: 25.1 % (ref 40.7–50.3)
HEMOCCULT SP1 STL QL: NEGATIVE
HEMOCCULT SP2 STL QL: NORMAL
HEMOCCULT SP3 STL QL: NORMAL
HEMOGLOBIN: 7.1 G/DL (ref 13–17)
HEMOGLOBIN: 7.6 G/DL (ref 13–17)
HEMOGLOBIN: 7.8 G/DL (ref 13.5–17.5)
IMMATURE GRANULOCYTES: 1 %
IMMATURE RETIC FRACT: 11.7 % (ref 2.7–18.3)
INR BLD: 1
IRON SATURATION: 13 % (ref 20–55)
IRON: 30 UG/DL (ref 59–158)
LYMPHOCYTES # BLD: 25 % (ref 24–43)
LYMPHOCYTES ABSOLUTE: ABNORMAL /ΜL
LYMPHOCYTES RELATIVE PERCENT: ABNORMAL %
MCH RBC QN AUTO: 30.9 PG (ref 25.2–33.5)
MCH RBC QN AUTO: 32.3 PG
MCHC RBC AUTO-ENTMCNC: 30.3 G/DL (ref 28.4–34.8)
MCHC RBC AUTO-ENTMCNC: 34.1 G/DL
MCV RBC AUTO: 102 FL (ref 82.6–102.9)
MCV RBC AUTO: 95 FL
MONOCYTES # BLD: 12 % (ref 3–12)
MONOCYTES ABSOLUTE: ABNORMAL /ΜL
MONOCYTES RELATIVE PERCENT: ABNORMAL %
NEUTROPHILS ABSOLUTE: ABNORMAL /ΜL
NEUTROPHILS RELATIVE PERCENT: ABNORMAL %
NRBC AUTOMATED: 0 PER 100 WBC
PARTIAL THROMBOPLASTIN TIME: 22.7 SEC (ref 23–31)
PDW BLD-RTO: 13.3 % (ref 11.8–14.4)
PLATELET # BLD: 150 K/UL (ref 138–453)
PLATELET # BLD: 157 K/ΜL
PLATELET ESTIMATE: ABNORMAL
PMV BLD AUTO: 10.1 FL (ref 8.1–13.5)
PMV BLD AUTO: 8.9 FL
POTASSIUM SERPL-SCNC: 4.2 MMOL/L (ref 3.7–5.3)
PROTHROMBIN TIME: 10.1 SEC (ref 9.7–11.6)
RBC # BLD: 2.42 10^6/ΜL
RBC # BLD: 2.46 M/UL (ref 4.21–5.77)
RBC # BLD: ABNORMAL 10*6/UL
RETIC %: 2.3 % (ref 0.5–1.9)
RETIC HEMOGLOBIN: 31.1 PG (ref 28.2–35.7)
SEG NEUTROPHILS: 61 % (ref 36–65)
SEGMENTED NEUTROPHILS ABSOLUTE COUNT: 3.9 K/UL (ref 1.5–8.1)
SODIUM BLD-SCNC: 141 MMOL/L (ref 135–144)
TIME, STOOL #1: 1518
TIME, STOOL #2: NORMAL
TIME, STOOL #3: NORMAL
TOTAL IRON BINDING CAPACITY: 236 UG/DL (ref 250–450)
UNSATURATED IRON BINDING CAPACITY: 206 UG/DL (ref 112–347)
VITAMIN B-12: 406 PG/ML (ref 232–1245)
WBC # BLD: 6.3 10^3/ML
WBC # BLD: 6.4 K/UL (ref 3.5–11.3)
WBC # BLD: ABNORMAL 10*3/UL

## 2019-09-17 PROCEDURE — 82746 ASSAY OF FOLIC ACID SERUM: CPT

## 2019-09-17 PROCEDURE — 86901 BLOOD TYPING SEROLOGIC RH(D): CPT

## 2019-09-17 PROCEDURE — 85018 HEMOGLOBIN: CPT

## 2019-09-17 PROCEDURE — 85610 PROTHROMBIN TIME: CPT

## 2019-09-17 PROCEDURE — 85730 THROMBOPLASTIN TIME PARTIAL: CPT

## 2019-09-17 PROCEDURE — 1200000000 HC SEMI PRIVATE

## 2019-09-17 PROCEDURE — 36415 COLL VENOUS BLD VENIPUNCTURE: CPT

## 2019-09-17 PROCEDURE — 2580000003 HC RX 258: Performed by: FAMILY MEDICINE

## 2019-09-17 PROCEDURE — 6370000000 HC RX 637 (ALT 250 FOR IP): Performed by: FAMILY MEDICINE

## 2019-09-17 PROCEDURE — 86920 COMPATIBILITY TEST SPIN: CPT

## 2019-09-17 PROCEDURE — 82728 ASSAY OF FERRITIN: CPT

## 2019-09-17 PROCEDURE — 82272 OCCULT BLD FECES 1-3 TESTS: CPT

## 2019-09-17 PROCEDURE — 99284 EMERGENCY DEPT VISIT MOD MDM: CPT

## 2019-09-17 PROCEDURE — 85025 COMPLETE CBC W/AUTO DIFF WBC: CPT

## 2019-09-17 PROCEDURE — 99222 1ST HOSP IP/OBS MODERATE 55: CPT | Performed by: FAMILY MEDICINE

## 2019-09-17 PROCEDURE — 85045 AUTOMATED RETICULOCYTE COUNT: CPT

## 2019-09-17 PROCEDURE — 80048 BASIC METABOLIC PNL TOTAL CA: CPT

## 2019-09-17 PROCEDURE — 83540 ASSAY OF IRON: CPT

## 2019-09-17 PROCEDURE — 82607 VITAMIN B-12: CPT

## 2019-09-17 PROCEDURE — 86900 BLOOD TYPING SEROLOGIC ABO: CPT

## 2019-09-17 PROCEDURE — 86850 RBC ANTIBODY SCREEN: CPT

## 2019-09-17 PROCEDURE — 6360000002 HC RX W HCPCS: Performed by: FAMILY MEDICINE

## 2019-09-17 PROCEDURE — 83550 IRON BINDING TEST: CPT

## 2019-09-17 PROCEDURE — C9113 INJ PANTOPRAZOLE SODIUM, VIA: HCPCS | Performed by: FAMILY MEDICINE

## 2019-09-17 RX ORDER — ACETAMINOPHEN 325 MG/1
650 TABLET ORAL EVERY 4 HOURS PRN
Status: DISCONTINUED | OUTPATIENT
Start: 2019-09-17 | End: 2019-09-19 | Stop reason: HOSPADM

## 2019-09-17 RX ORDER — ONDANSETRON 2 MG/ML
4 INJECTION INTRAMUSCULAR; INTRAVENOUS EVERY 6 HOURS PRN
Status: DISCONTINUED | OUTPATIENT
Start: 2019-09-17 | End: 2019-09-17 | Stop reason: ALTCHOICE

## 2019-09-17 RX ORDER — 0.9 % SODIUM CHLORIDE 0.9 %
10 VIAL (ML) INJECTION DAILY
Status: DISCONTINUED | OUTPATIENT
Start: 2019-09-17 | End: 2019-09-19 | Stop reason: HOSPADM

## 2019-09-17 RX ORDER — ONDANSETRON 4 MG/1
4 TABLET, ORALLY DISINTEGRATING ORAL EVERY 6 HOURS PRN
Status: DISCONTINUED | OUTPATIENT
Start: 2019-09-17 | End: 2019-09-19 | Stop reason: HOSPADM

## 2019-09-17 RX ORDER — TRAMADOL HYDROCHLORIDE 50 MG/1
50 TABLET ORAL EVERY 12 HOURS PRN
Status: DISCONTINUED | OUTPATIENT
Start: 2019-09-17 | End: 2019-09-19 | Stop reason: HOSPADM

## 2019-09-17 RX ORDER — LISINOPRIL 20 MG/1
20 TABLET ORAL DAILY
Status: DISCONTINUED | OUTPATIENT
Start: 2019-09-17 | End: 2019-09-19 | Stop reason: HOSPADM

## 2019-09-17 RX ORDER — TIZANIDINE 2 MG/1
2 TABLET ORAL EVERY 6 HOURS PRN
Status: DISCONTINUED | OUTPATIENT
Start: 2019-09-17 | End: 2019-09-19 | Stop reason: HOSPADM

## 2019-09-17 RX ORDER — CLOPIDOGREL BISULFATE 75 MG/1
75 TABLET ORAL DAILY
Status: ON HOLD | COMMUNITY
End: 2019-09-18 | Stop reason: HOSPADM

## 2019-09-17 RX ORDER — PRAVASTATIN SODIUM 20 MG
20 TABLET ORAL NIGHTLY
Status: DISCONTINUED | OUTPATIENT
Start: 2019-09-17 | End: 2019-09-19 | Stop reason: HOSPADM

## 2019-09-17 RX ORDER — SODIUM CHLORIDE 9 MG/ML
INJECTION, SOLUTION INTRAVENOUS CONTINUOUS
Status: DISCONTINUED | OUTPATIENT
Start: 2019-09-17 | End: 2019-09-19 | Stop reason: HOSPADM

## 2019-09-17 RX ORDER — PANTOPRAZOLE SODIUM 40 MG/10ML
40 INJECTION, POWDER, LYOPHILIZED, FOR SOLUTION INTRAVENOUS DAILY
Status: DISCONTINUED | OUTPATIENT
Start: 2019-09-17 | End: 2019-09-19 | Stop reason: HOSPADM

## 2019-09-17 RX ORDER — AMLODIPINE BESYLATE 2.5 MG/1
2.5 TABLET ORAL DAILY
Status: DISCONTINUED | OUTPATIENT
Start: 2019-09-17 | End: 2019-09-18

## 2019-09-17 RX ORDER — TAMSULOSIN HYDROCHLORIDE 0.4 MG/1
0.4 CAPSULE ORAL DAILY
Status: DISCONTINUED | OUTPATIENT
Start: 2019-09-17 | End: 2019-09-19 | Stop reason: HOSPADM

## 2019-09-17 RX ORDER — ONDANSETRON 2 MG/ML
4 INJECTION INTRAMUSCULAR; INTRAVENOUS EVERY 6 HOURS PRN
Status: DISCONTINUED | OUTPATIENT
Start: 2019-09-17 | End: 2019-09-19 | Stop reason: HOSPADM

## 2019-09-17 RX ORDER — TRAMADOL HYDROCHLORIDE 50 MG/1
50 TABLET ORAL PRN
Status: DISCONTINUED | OUTPATIENT
Start: 2019-09-17 | End: 2019-09-17

## 2019-09-17 RX ORDER — LEVOTHYROXINE SODIUM 0.1 MG/1
100 TABLET ORAL DAILY
Status: DISCONTINUED | OUTPATIENT
Start: 2019-09-18 | End: 2019-09-19 | Stop reason: HOSPADM

## 2019-09-17 RX ORDER — SODIUM CHLORIDE 0.9 % (FLUSH) 0.9 %
10 SYRINGE (ML) INJECTION PRN
Status: DISCONTINUED | OUTPATIENT
Start: 2019-09-17 | End: 2019-09-19 | Stop reason: HOSPADM

## 2019-09-17 RX ORDER — SODIUM CHLORIDE 0.9 % (FLUSH) 0.9 %
10 SYRINGE (ML) INJECTION EVERY 12 HOURS SCHEDULED
Status: DISCONTINUED | OUTPATIENT
Start: 2019-09-17 | End: 2019-09-19 | Stop reason: HOSPADM

## 2019-09-17 RX ADMIN — PRAVASTATIN SODIUM 20 MG: 20 TABLET ORAL at 20:18

## 2019-09-17 RX ADMIN — Medication 10 ML: at 20:20

## 2019-09-17 RX ADMIN — SODIUM CHLORIDE: 9 INJECTION, SOLUTION INTRAVENOUS at 18:21

## 2019-09-17 RX ADMIN — PANTOPRAZOLE SODIUM 40 MG: 40 INJECTION, POWDER, FOR SOLUTION INTRAVENOUS at 20:20

## 2019-09-17 RX ADMIN — ACETAMINOPHEN 650 MG: 325 TABLET ORAL at 20:18

## 2019-09-17 RX ADMIN — Medication 10 ML: at 20:21

## 2019-09-17 ASSESSMENT — ENCOUNTER SYMPTOMS
RHINORRHEA: 0
NAUSEA: 0
BACK PAIN: 0
ABDOMINAL PAIN: 0
SINUS PRESSURE: 0
CHOKING: 0
VOMITING: 0
COUGH: 0
WHEEZING: 0
DIARRHEA: 0
CHEST TIGHTNESS: 0
VOICE CHANGE: 0
CONSTIPATION: 1
SHORTNESS OF BREATH: 0

## 2019-09-17 ASSESSMENT — PAIN SCALES - GENERAL
PAINLEVEL_OUTOF10: 0
PAINLEVEL_OUTOF10: 8

## 2019-09-17 NOTE — H&P
[facility]    HISTORY AND PHYSICAL EXAMINATION            Date:   9/17/2019  Patient name:  Nasreen Mckeon  Date of admission:  9/17/2019  2:58 PM  MRN:   [id number]  Account:  [de-identified]  YOB: 1929  PCP:    Rickey Crooks MD  Room:   100/1006-02  Code Status:    Full Code    Chief Complaint:     Chief Complaint   Patient presents with    Anemia     sent today by PCP for low HGB       History Obtained From:     patient, electronic medical record    History of Present Illness:     Nasreen Mckeon is a 80 y.o. Non-/non  male who presents with Anemia (sent today by PCP for low HGB)   and is admitted to the hospital for the management of Severe anemia. Patient was sent to emergency room by his PCP for abnormal labs. Patient has been having severe fatigue, increased sleep and activity for last 1 month. He also had difficulty breathing on exertion. Outpatient labs were ordered and was found to have a very low hemoglobin 7.8. Patient denies any abdominal pain, nausea, vomiting, melena, hematochezia. He is having alternating constipation, diarrhea. He also had weight loss of about 15 pounds in last 3 months. He has underlying history of hypertension and chronic kidney disease stage IV. 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 Patient is not on any long-term anticoagulation. He takes aspirin and denies any use of NSAIDs. 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 Patient denies alcohol use. He is otherwise asymptomatic.     Past Medical History:     Past Medical History:   Diagnosis Date    Aden[facility] ([facility] 7-28-14    Dr. Anusha Archer Chronic back pain     CRF (chronic renal failure)     Hypertension     Renal insufficiency     stage 4    Thyroid disease         Past Surgical History:     Past Surgical History:   Procedure reports that he does not use drugs. Family History:     Family History   Problem Relation Age of Onset    Cancer Father        Review of Systems:     Positive and Negative as described in HPI. Review of Systems   Constitutional: Positive for fatigue and unexpected weight change. Negative for activity change, appetite change and fever. HENT: Negative for congestion, nosebleeds, rhinorrhea, sinus pressure, sneezing and voice change. Respiratory: Negative for cough, choking, chest tightness, shortness of breath and wheezing. Cardiovascular: Negative for chest pain, palpitations and leg swelling. Gastrointestinal: Positive for constipation. Negative for abdominal pain, diarrhea, nausea and vomiting. Genitourinary: Negative for difficulty urinating, discharge, dysuria, frequency and testicular pain. Musculoskeletal: Negative for back pain. Skin: Negative for rash. Neurological: Negative for dizziness, weakness, light-headedness and headaches. Hematological: Does not bruise/bleed easily. Psychiatric/Behavioral: Negative for agitation, behavioral problems, confusion, self-injury, sleep disturbance and suicidal ideas. Physical Exam:   BP (!) 160/72   Pulse 76   Temp 97.9 °F (36.6 °C) (Oral)   Resp 20   Ht 6' (1.829 m)   Wt 165 lb (74.8 kg)   SpO2 96%   BMI 22.38 kg/m²   Temp (24hrs), Av °F (36.7 °C), Min:97.9 °F (36.6 °C), Max:98 °F (36.7 °C)    No results for input(s): POCGLU in the last 72 hours. Intake/Output Summary (Last 24 hours) at 2019 1837  Last data filed at 2019 1822  Gross per 24 hour   Intake 240 ml   Output --   Net 240 ml       Physical Exam   Constitutional: He is oriented to person, place, and time. Vital signs are normal. He is cooperative. No distress. HENT:   Head: Normocephalic and atraumatic. Nose: Right sinus exhibits no maxillary sinus tenderness and no frontal sinus tenderness.  Left sinus exhibits no maxillary sinus tenderness and no frontal sinus tenderness. Mouth/Throat: Oropharynx is clear and moist. No oropharyngeal exudate. Eyes: Pupils are equal, round, and reactive to light. Conjunctivae and EOM are normal. No scleral icterus. Conjunctival pallor   Neck: Full passive range of motion without pain. Neck supple. No JVD present. No thyromegaly present. Cardiovascular: Normal rate, regular rhythm and normal heart sounds. No murmur heard. Pulses:       Dorsalis pedis pulses are 2+ on the right side, and 2+ on the left side. Pulmonary/Chest: Effort normal and breath sounds normal. He has no wheezes. He has no rales. Abdominal: Soft. He exhibits no mass. There is no tenderness. Lymphadenopathy:        Head (right side): No submandibular adenopathy present. Head (left side): No submandibular adenopathy present. He has no cervical adenopathy. Neurological: He is alert and oriented to person, place, and time. He displays no tremor. Skin: Skin is warm. He is not diaphoretic. Nursing note and vitals reviewed.       Investigations:      Laboratory Testing:  Recent Results (from the past 24 hour(s))   BLOOD OCCULT STOOL #1    Collection Time: 09/17/19  3:18 PM   Result Value Ref Range    Occult Blood, Stool #1 NEGATIVE NEGATIVE    Date, Stool #1 1,706,774     Time, Stool #1 1,518     Occult Blood, Stool #2 NOT REPORTED NEGATIVE    Date, Stool #2 NOT REPORTED     Time, Stool #2 NOT REPORTED     Occult Blood, Stool #3 NOT REPORTED NEGATIVE    Date, Stool #3 NOT REPORTED     Time, Stool #3 NOT REPORTED    CBC with DIFF    Collection Time: 09/17/19  3:30 PM   Result Value Ref Range    WBC 6.4 3.5 - 11.3 k/uL    RBC 2.46 (L) 4.21 - 5.77 m/uL    Hemoglobin 7.6 (L) 13.0 - 17.0 g/dL    Hematocrit 25.1 (L) 40.7 - 50.3 %    .0 82.6 - 102.9 fL    MCH 30.9 25.2 - 33.5 pg    MCHC 30.3 28.4 - 34.8 g/dL    RDW 13.3 11.8 - 14.4 %    Platelets 774 337 - 339 k/uL    MPV 10.1 8.1 - 13.5 fL    NRBC Automated 0.0 0.0 per 100 WBC

## 2019-09-17 NOTE — PROGRESS NOTES
Transitions of Care Pharmacy Service   Medication Review    The patient's list of current home medications was reviewed while pt was in the ED earlier today. Source(s) of information: patient, Epic, Surescripts refill report      Please feel free to call me with any questions about this encounter. Thank you. Rhett Muhammad, [facility]   Transitions of Care Pharmacy Service  Phone:  [phone]  Fax: [phone]      Electronically signed by Rhett Muhammad [facility] on 9/17/2019 at 6:36 PM           Medications Prior to Admission:   clopidogrel (PLAVIX) 75 MG tablet, Take 75 mg by mouth daily  pravastatin (PRAVACHOL) 20 MG tablet, TAKE 1 TABLET DAILY  RABEprazole (ACIPHEX) 20 MG tablet, TAKE 1 TABLET DAILY  ranitidine (ZANTAC) 150 MG tablet, TAKE 1 TABLET DAILY  traMADol (ULTRAM) 50 MG tablet, Take 1 tablet by mouth every 8 hours as needed for Pain. tiZANidine (ZANAFLEX) 2 MG tablet, Take 2 mg by mouth every 6 hours as needed (muscle spasm)   tamsulosin (FLOMAX) 0.4 MG capsule, Take 1 capsule by mouth daily. Misc. Devices MISC, [place] [employer] DX: Generalized weakness  Leuprolide Acetate (ELIGARD SC), Inject into the skin Twice yearly at MD office  levothyroxine (SYNTHROID) 100 MCG tablet, Take 1 tablet by mouth Daily  amLODIPine (NORVASC) 2.5 MG tablet, Take 1 tablet by mouth daily  lisinopril (PRINIVIL;ZESTRIL) 20 MG tablet, Take 20 mg by mouth daily. aspirin 81 MG tablet, Take 81 mg by mouth daily.

## 2019-09-18 ENCOUNTER — APPOINTMENT (OUTPATIENT)
Dept: GENERAL RADIOLOGY | Age: 84
DRG: 812 | End: 2019-09-18
Payer: COMMERCIAL

## 2019-09-18 LAB
ANION GAP SERPL CALCULATED.3IONS-SCNC: 8 MMOL/L (ref 9–17)
BUN BLDV-MCNC: 30 MG/DL (ref 8–23)
BUN/CREAT BLD: 17 (ref 9–20)
CALCIUM SERPL-MCNC: 8.7 MG/DL (ref 8.6–10.4)
CHLORIDE BLD-SCNC: 111 MMOL/L (ref 98–107)
CO2: 22 MMOL/L (ref 20–31)
CREAT SERPL-MCNC: 1.78 MG/DL (ref 0.7–1.2)
GFR AFRICAN AMERICAN: 44 ML/MIN
GFR NON-AFRICAN AMERICAN: 36 ML/MIN
GFR SERPL CREATININE-BSD FRML MDRD: ABNORMAL ML/MIN/{1.73_M2}
GFR SERPL CREATININE-BSD FRML MDRD: ABNORMAL ML/MIN/{1.73_M2}
GLUCOSE BLD-MCNC: 96 MG/DL (ref 70–99)
HCT VFR BLD CALC: 24.1 % (ref 40.7–50.3)
HEMOGLOBIN: 7.5 G/DL (ref 13–17)
HEMOGLOBIN: 8.8 G/DL (ref 13–17)
INR BLD: 1
MCH RBC QN AUTO: 30.7 PG (ref 25.2–33.5)
MCHC RBC AUTO-ENTMCNC: 31.1 G/DL (ref 28.4–34.8)
MCV RBC AUTO: 98.8 FL (ref 82.6–102.9)
NRBC AUTOMATED: 0 PER 100 WBC
PDW BLD-RTO: 13.2 % (ref 11.8–14.4)
PLATELET # BLD: 140 K/UL (ref 138–453)
PMV BLD AUTO: 10.3 FL (ref 8.1–13.5)
POTASSIUM SERPL-SCNC: 4.4 MMOL/L (ref 3.7–5.3)
PROTHROMBIN TIME: 10.2 SEC (ref 9.7–11.6)
RBC # BLD: 2.44 M/UL (ref 4.21–5.77)
SODIUM BLD-SCNC: 141 MMOL/L (ref 135–144)
TSH SERPL DL<=0.05 MIU/L-ACNC: 1.86 MIU/L (ref 0.3–5)
WBC # BLD: 6.3 K/UL (ref 3.5–11.3)

## 2019-09-18 PROCEDURE — 6360000002 HC RX W HCPCS: Performed by: FAMILY MEDICINE

## 2019-09-18 PROCEDURE — 85018 HEMOGLOBIN: CPT

## 2019-09-18 PROCEDURE — C9113 INJ PANTOPRAZOLE SODIUM, VIA: HCPCS | Performed by: FAMILY MEDICINE

## 2019-09-18 PROCEDURE — 97116 GAIT TRAINING THERAPY: CPT

## 2019-09-18 PROCEDURE — 36415 COLL VENOUS BLD VENIPUNCTURE: CPT

## 2019-09-18 PROCEDURE — 85027 COMPLETE CBC AUTOMATED: CPT

## 2019-09-18 PROCEDURE — 2580000003 HC RX 258: Performed by: FAMILY MEDICINE

## 2019-09-18 PROCEDURE — 1200000000 HC SEMI PRIVATE

## 2019-09-18 PROCEDURE — 99222 1ST HOSP IP/OBS MODERATE 55: CPT | Performed by: INTERNAL MEDICINE

## 2019-09-18 PROCEDURE — 74018 RADEX ABDOMEN 1 VIEW: CPT

## 2019-09-18 PROCEDURE — 80048 BASIC METABOLIC PNL TOTAL CA: CPT

## 2019-09-18 PROCEDURE — 2500000003 HC RX 250 WO HCPCS: Performed by: FAMILY MEDICINE

## 2019-09-18 PROCEDURE — 2500000003 HC RX 250 WO HCPCS: Performed by: NURSE PRACTITIONER

## 2019-09-18 PROCEDURE — P9016 RBC LEUKOCYTES REDUCED: HCPCS

## 2019-09-18 PROCEDURE — 85610 PROTHROMBIN TIME: CPT

## 2019-09-18 PROCEDURE — 6360000002 HC RX W HCPCS: Performed by: NURSE PRACTITIONER

## 2019-09-18 PROCEDURE — 84443 ASSAY THYROID STIM HORMONE: CPT

## 2019-09-18 PROCEDURE — 97530 THERAPEUTIC ACTIVITIES: CPT

## 2019-09-18 PROCEDURE — 6370000000 HC RX 637 (ALT 250 FOR IP): Performed by: FAMILY MEDICINE

## 2019-09-18 PROCEDURE — 99232 SBSQ HOSP IP/OBS MODERATE 35: CPT | Performed by: FAMILY MEDICINE

## 2019-09-18 PROCEDURE — 86900 BLOOD TYPING SEROLOGIC ABO: CPT

## 2019-09-18 PROCEDURE — 97162 PT EVAL MOD COMPLEX 30 MIN: CPT

## 2019-09-18 PROCEDURE — 36430 TRANSFUSION BLD/BLD COMPNT: CPT

## 2019-09-18 RX ORDER — RABEPRAZOLE SODIUM 20 MG/1
20 TABLET, DELAYED RELEASE ORAL DAILY
Qty: 90 TABLET | Refills: 1 | Status: SHIPPED | OUTPATIENT
Start: 2019-09-18 | End: 2019-09-19 | Stop reason: HOSPADM

## 2019-09-18 RX ORDER — METOPROLOL TARTRATE 5 MG/5ML
5 INJECTION INTRAVENOUS ONCE
Status: COMPLETED | OUTPATIENT
Start: 2019-09-18 | End: 2019-09-18

## 2019-09-18 RX ORDER — 0.9 % SODIUM CHLORIDE 0.9 %
250 INTRAVENOUS SOLUTION INTRAVENOUS ONCE
Status: COMPLETED | OUTPATIENT
Start: 2019-09-18 | End: 2019-09-19

## 2019-09-18 RX ORDER — AMLODIPINE BESYLATE 5 MG/1
5 TABLET ORAL DAILY
Status: DISCONTINUED | OUTPATIENT
Start: 2019-09-19 | End: 2019-09-19 | Stop reason: HOSPADM

## 2019-09-18 RX ORDER — HYDRALAZINE HYDROCHLORIDE 20 MG/ML
10 INJECTION INTRAMUSCULAR; INTRAVENOUS EVERY 6 HOURS PRN
Status: DISCONTINUED | OUTPATIENT
Start: 2019-09-18 | End: 2019-09-19 | Stop reason: HOSPADM

## 2019-09-18 RX ORDER — MORPHINE SULFATE 2 MG/ML
2 INJECTION, SOLUTION INTRAMUSCULAR; INTRAVENOUS ONCE
Status: COMPLETED | OUTPATIENT
Start: 2019-09-18 | End: 2019-09-18

## 2019-09-18 RX ADMIN — PANTOPRAZOLE SODIUM 40 MG: 40 INJECTION, POWDER, FOR SOLUTION INTRAVENOUS at 08:48

## 2019-09-18 RX ADMIN — LISINOPRIL 20 MG: 20 TABLET ORAL at 05:20

## 2019-09-18 RX ADMIN — Medication 10 ML: at 08:48

## 2019-09-18 RX ADMIN — MORPHINE SULFATE 2 MG: 2 INJECTION, SOLUTION INTRAMUSCULAR; INTRAVENOUS at 05:16

## 2019-09-18 RX ADMIN — PRAVASTATIN SODIUM 20 MG: 20 TABLET ORAL at 21:26

## 2019-09-18 RX ADMIN — SODIUM CHLORIDE 250 ML: 9 INJECTION, SOLUTION INTRAVENOUS at 11:48

## 2019-09-18 RX ADMIN — TAMSULOSIN HYDROCHLORIDE 0.4 MG: 0.4 CAPSULE ORAL at 21:26

## 2019-09-18 RX ADMIN — TRAMADOL HYDROCHLORIDE 50 MG: 50 TABLET, FILM COATED ORAL at 03:05

## 2019-09-18 RX ADMIN — Medication 10 ML: at 21:27

## 2019-09-18 RX ADMIN — METOPROLOL TARTRATE 5 MG: 5 INJECTION INTRAVENOUS at 16:48

## 2019-09-18 RX ADMIN — IRON SUCROSE 200 MG: 20 INJECTION, SOLUTION INTRAVENOUS at 08:48

## 2019-09-18 RX ADMIN — AMLODIPINE BESYLATE 2.5 MG: 2.5 TABLET ORAL at 05:20

## 2019-09-18 RX ADMIN — METOPROLOL TARTRATE 5 MG: 5 INJECTION INTRAVENOUS at 07:24

## 2019-09-18 RX ADMIN — ONDANSETRON 4 MG: 2 INJECTION INTRAMUSCULAR; INTRAVENOUS at 22:50

## 2019-09-18 RX ADMIN — ONDANSETRON 4 MG: 4 TABLET, ORALLY DISINTEGRATING ORAL at 05:24

## 2019-09-18 RX ADMIN — Medication 10 ML: at 08:49

## 2019-09-18 RX ADMIN — HYDRALAZINE HYDROCHLORIDE 10 MG: 20 INJECTION INTRAMUSCULAR; INTRAVENOUS at 12:48

## 2019-09-18 ASSESSMENT — ENCOUNTER SYMPTOMS
CONSTIPATION: 0
COUGH: 0
WHEEZING: 0
CHOKING: 0
RHINORRHEA: 0
NAUSEA: 0
SINUS PRESSURE: 0
VOICE CHANGE: 0
CHEST TIGHTNESS: 0
VOMITING: 0
SHORTNESS OF BREATH: 0
DIARRHEA: 0
BACK PAIN: 0
ABDOMINAL PAIN: 0

## 2019-09-18 ASSESSMENT — PAIN SCALES - GENERAL
PAINLEVEL_OUTOF10: 6
PAINLEVEL_OUTOF10: 10

## 2019-09-18 NOTE — PROGRESS NOTES
(36.4 °C), Max:98 °F (36.7 °C)    No results for input(s): POCGLU in the last 72 hours. I/O (24Hr): Intake/Output Summary (Last 24 hours) at 9/18/2019 0709  Last data filed at 9/18/2019 9588  Gross per 24 hour   Intake 844 ml   Output 600 ml   Net 244 ml       Labs:  Hematology:  Recent Labs     09/16/19 09/17/19 1530 09/17/19 2002 09/18/19  0507   WBC 6.3 6.4  --  6.3   RBC 2.42 2.46*  --  2.44*   HGB 7.8* 7.6* 7.1* 7.5*   HCT 22.9* 25.1*  --  24.1*   MCV 95 102.0  --  98.8   MCH 32.3 30.9  --  30.7   MCHC 34.1 30.3  --  31.1   RDW  --  13.3  --  13.2    150  --  140   MPV 8.9 10.1  --  10.3   INR  --  1.0  --  1.0     Chemistry:  Recent Labs     09/17/19  1530 09/18/19  0507    141   K 4.2 4.4   * 111*   CO2 22 22   GLUCOSE 115* 96   BUN 34* 30*   CREATININE 1.86* 1.78*   ANIONGAP 11 8*   LABGLOM 34* 36*   GFRAA 42* 44*   CALCIUM 8.7 8.7     Recent Labs     09/18/19  0507   TSH 1.86     ABG:No results found for: POCPH, PHART, PH, POCPCO2, IVZ3VHL, PCO2, POCPO2, PO2ART, PO2, POCHCO3, VDM4VAD, HCO3, NBEA, PBEA, BEART, BE, THGBART, THB, CNZ3JOD, QMBG2BXO, M6USQIVQ, O2SAT, FIO2  No results found for: SPECIAL  No results found for: CULTURE    Radiology:  Xr Abdomen (kub) (single Ap View)    Result Date: 9/18/2019  Nonspecific bowel gas pattern without evidence for obstruction. Physical Examination:        Physical Exam   Constitutional: He is oriented to person, place, and time. Vital signs are normal. He is cooperative. No distress. HENT:   Head: Normocephalic and atraumatic. Nose: Right sinus exhibits no maxillary sinus tenderness and no frontal sinus tenderness. Left sinus exhibits no maxillary sinus tenderness and no frontal sinus tenderness. Mouth/Throat: Oropharynx is clear and moist. No oropharyngeal exudate. Eyes: Pupils are equal, round, and reactive to light. Conjunctivae and EOM are normal. No scleral icterus.    Pallor    Neck: Full passive range of motion without

## 2019-09-18 NOTE — FLOWSHEET NOTE
09/18/19 1137   Provider Notification   Reason for Communication Review case  (/63, HR 68)   Provider Name Dr. Elier Faustin   Provider Notification Physician   Method of Communication Secure Message   Response Waiting for response   Notification Time [phone]

## 2019-09-18 NOTE — FLOWSHEET NOTE
09/18/19 1203   Transfuse RBC   Patient Observed Initial 15 Min  Yes   Suspected Reaction No     Writer remained with pt for first 15 minutes of blood transfusion, no signs/symptoms of reaction noted, will continue to monitor.

## 2019-09-18 NOTE — PROGRESS NOTES
risk;  Short term goal 4: Pt able to tolerate 30-40 min of activity to include 15-20 reps of ex & functional mobility including 5 minutes of standing to facilitate activity tolerance to [facility]; Short term goal 5: Pt able to go up/down 8 steps with one rail indep;   Short term goal 6: Ed pt on home ex's, safety & energy principles & issue written home program       Therapy Time   Individual Concurrent Group Co-treatment   Time In 1400         Time Out 1428         Minutes 28+10=38           Additional 10 minutes for chart review         [address], PT

## 2019-09-18 NOTE — CARE COORDINATION
Case Management Initial Discharge Plan  [place],         Readmission Risk              Risk of Unplanned Readmission:        12             Met with:patient to discuss discharge plans.    Information verified: address, contacts, phone number, , insurance Yes  PCP: Leandro Carter MD  Date of last visit: 19    Insurance Provider: [place] [employer]    Discharge Planning  Current Residence:  house  Living Arrangements:  Children, Spouse/Significant Other   Home has 1 stories/3 stairs to climb  Support Systems:  Family Members, Spouse/Significant Other, Children  Current Services PTA:  Skilled, pt/ot Agency: [facility]  Patient able to perform ADL's:Assisted  DME in home:  Jorge Cramp, rollator, walker  DME used to aid ambulation prior to admission:   Rollator, cane  DME used during admission:  tbd    Potential Assistance Needed:  [place]: express script and [employer] on secor/matias   Potential Assistance Purchasing Medications:  No  Does patient want to participate in local refill/ meds to beds program?  Not Assessed    Patient agreeable to home care: Yes  Freedom of choice provided:  Yes  Current w/ [facility]      Type of Home Care Services:  PT, Nursing Services  Patient expects to be discharged to:  Home     Prior SNF/Rehab Placement and Facility: none  Agreeable to SNF/Rehab: No  [place] of choice provided: no   Evaluation: no    Expected Discharge date:  19  Follow Up Appointment: Best Day/ Time: Monday AM    Transportation provider: yes, family  Transportation arrangements needed for discharge: No    Discharge Plan: home w/ [facility]  Lives w/ wife in 1 level home and son lives w/ them  Has 3 steps to get into the home from the back  Assisted w/ adl's pta by wife  Current w/ [facility] and will like to continue     Called and notified Suzette Noe w/ [facility] of admission  JJ initiated  Will continue to

## 2019-09-18 NOTE — FLOWSHEET NOTE
09/18/19 1637   Provider Notification   Reason for Communication Review case  (/78)   Provider Name Dr. Leonora Hoang   Provider Notification Physician   Method of Communication Secure Message   Response Waiting for response   Notification Time [phone]     Dr. Melissa Light updated on pt's BP and not yet due for PRN hydralazine. Awaiting response. Will continue to monitor.

## 2019-09-19 ENCOUNTER — ANESTHESIA (OUTPATIENT)
Dept: OPERATING ROOM | Age: 84
DRG: 812 | End: 2019-09-19
Payer: COMMERCIAL

## 2019-09-19 ENCOUNTER — ANESTHESIA EVENT (OUTPATIENT)
Dept: OPERATING ROOM | Age: 84
DRG: 812 | End: 2019-09-19
Payer: COMMERCIAL

## 2019-09-19 VITALS — DIASTOLIC BLOOD PRESSURE: 88 MMHG | SYSTOLIC BLOOD PRESSURE: 184 MMHG | OXYGEN SATURATION: 95 %

## 2019-09-19 VITALS
TEMPERATURE: 97.9 F | RESPIRATION RATE: 20 BRPM | BODY MASS INDEX: 22.47 KG/M2 | HEART RATE: 85 BPM | SYSTOLIC BLOOD PRESSURE: 156 MMHG | OXYGEN SATURATION: 98 % | WEIGHT: 165.9 LBS | HEIGHT: 72 IN | DIASTOLIC BLOOD PRESSURE: 69 MMHG

## 2019-09-19 PROBLEM — K20.90 ESOPHAGITIS: Status: ACTIVE | Noted: 2019-09-19

## 2019-09-19 PROBLEM — K44.9 LARGE HIATAL HERNIA: Status: ACTIVE | Noted: 2019-09-19

## 2019-09-19 PROBLEM — K29.80 DUODENITIS: Status: ACTIVE | Noted: 2019-09-19

## 2019-09-19 LAB
ABO/RH: NORMAL
ANTIBODY SCREEN: NEGATIVE
ARM BAND NUMBER: NORMAL
BLD PROD TYP BPU: NORMAL
CROSSMATCH RESULT: NORMAL
DISPENSE STATUS BLOOD BANK: NORMAL
EXPIRATION DATE: NORMAL
HEMOGLOBIN: 9.2 G/DL (ref 13–17)
TRANSFUSION STATUS: NORMAL
UNIT DIVISION: 0
UNIT NUMBER: NORMAL

## 2019-09-19 PROCEDURE — 2580000003 HC RX 258: Performed by: NURSE ANESTHETIST, CERTIFIED REGISTERED

## 2019-09-19 PROCEDURE — 0DB68ZX EXCISION OF STOMACH, VIA NATURAL OR ARTIFICIAL OPENING ENDOSCOPIC, DIAGNOSTIC: ICD-10-PCS | Performed by: INTERNAL MEDICINE

## 2019-09-19 PROCEDURE — 7100000001 HC PACU RECOVERY - ADDTL 15 MIN: Performed by: INTERNAL MEDICINE

## 2019-09-19 PROCEDURE — 3609012400 HC EGD TRANSORAL BIOPSY SINGLE/MULTIPLE: Performed by: INTERNAL MEDICINE

## 2019-09-19 PROCEDURE — 6360000002 HC RX W HCPCS: Performed by: INTERNAL MEDICINE

## 2019-09-19 PROCEDURE — 88305 TISSUE EXAM BY PATHOLOGIST: CPT

## 2019-09-19 PROCEDURE — 7100000000 HC PACU RECOVERY - FIRST 15 MIN: Performed by: INTERNAL MEDICINE

## 2019-09-19 PROCEDURE — 2500000003 HC RX 250 WO HCPCS: Performed by: NURSE ANESTHETIST, CERTIFIED REGISTERED

## 2019-09-19 PROCEDURE — 99239 HOSP IP/OBS DSCHRG MGMT >30: CPT | Performed by: INTERNAL MEDICINE

## 2019-09-19 PROCEDURE — 6360000002 HC RX W HCPCS: Performed by: NURSE ANESTHETIST, CERTIFIED REGISTERED

## 2019-09-19 PROCEDURE — G0008 ADMIN INFLUENZA VIRUS VAC: HCPCS | Performed by: INTERNAL MEDICINE

## 2019-09-19 PROCEDURE — 6360000002 HC RX W HCPCS: Performed by: FAMILY MEDICINE

## 2019-09-19 PROCEDURE — 36415 COLL VENOUS BLD VENIPUNCTURE: CPT

## 2019-09-19 PROCEDURE — 97535 SELF CARE MNGMENT TRAINING: CPT

## 2019-09-19 PROCEDURE — 2580000003 HC RX 258: Performed by: FAMILY MEDICINE

## 2019-09-19 PROCEDURE — 2709999900 HC NON-CHARGEABLE SUPPLY: Performed by: INTERNAL MEDICINE

## 2019-09-19 PROCEDURE — 97166 OT EVAL MOD COMPLEX 45 MIN: CPT

## 2019-09-19 PROCEDURE — 0DB78ZX EXCISION OF STOMACH, PYLORUS, VIA NATURAL OR ARTIFICIAL OPENING ENDOSCOPIC, DIAGNOSTIC: ICD-10-PCS | Performed by: INTERNAL MEDICINE

## 2019-09-19 PROCEDURE — 43239 EGD BIOPSY SINGLE/MULTIPLE: CPT | Performed by: INTERNAL MEDICINE

## 2019-09-19 PROCEDURE — 3700000000 HC ANESTHESIA ATTENDED CARE: Performed by: INTERNAL MEDICINE

## 2019-09-19 PROCEDURE — 6370000000 HC RX 637 (ALT 250 FOR IP): Performed by: FAMILY MEDICINE

## 2019-09-19 PROCEDURE — C9113 INJ PANTOPRAZOLE SODIUM, VIA: HCPCS | Performed by: FAMILY MEDICINE

## 2019-09-19 PROCEDURE — 90686 IIV4 VACC NO PRSV 0.5 ML IM: CPT | Performed by: INTERNAL MEDICINE

## 2019-09-19 PROCEDURE — 85018 HEMOGLOBIN: CPT

## 2019-09-19 PROCEDURE — 3700000001 HC ADD 15 MINUTES (ANESTHESIA): Performed by: INTERNAL MEDICINE

## 2019-09-19 RX ORDER — SODIUM CHLORIDE 9 MG/ML
INJECTION, SOLUTION INTRAVENOUS CONTINUOUS PRN
Status: DISCONTINUED | OUTPATIENT
Start: 2019-09-19 | End: 2019-09-19 | Stop reason: SDUPTHER

## 2019-09-19 RX ORDER — PANTOPRAZOLE SODIUM 40 MG/1
40 TABLET, DELAYED RELEASE ORAL
Qty: 60 TABLET | Refills: 1 | Status: SHIPPED | OUTPATIENT
Start: 2019-09-19 | End: 2019-10-15 | Stop reason: SDUPTHER

## 2019-09-19 RX ORDER — ONDANSETRON 2 MG/ML
4 INJECTION INTRAMUSCULAR; INTRAVENOUS
Status: DISCONTINUED | OUTPATIENT
Start: 2019-09-19 | End: 2019-09-19 | Stop reason: HOSPADM

## 2019-09-19 RX ORDER — PROPOFOL 10 MG/ML
INJECTION, EMULSION INTRAVENOUS PRN
Status: DISCONTINUED | OUTPATIENT
Start: 2019-09-19 | End: 2019-09-19 | Stop reason: SDUPTHER

## 2019-09-19 RX ORDER — LIDOCAINE HYDROCHLORIDE 20 MG/ML
INJECTION, SOLUTION EPIDURAL; INFILTRATION; INTRACAUDAL; PERINEURAL PRN
Status: DISCONTINUED | OUTPATIENT
Start: 2019-09-19 | End: 2019-09-19 | Stop reason: SDUPTHER

## 2019-09-19 RX ORDER — ONDANSETRON 2 MG/ML
INJECTION INTRAMUSCULAR; INTRAVENOUS PRN
Status: DISCONTINUED | OUTPATIENT
Start: 2019-09-19 | End: 2019-09-19 | Stop reason: SDUPTHER

## 2019-09-19 RX ORDER — CLOPIDOGREL BISULFATE 75 MG/1
75 TABLET ORAL DAILY
Qty: 30 TABLET | Refills: 3
Start: 2019-09-19 | End: 2019-10-15 | Stop reason: SDUPTHER

## 2019-09-19 RX ORDER — LANOLIN ALCOHOL/MO/W.PET/CERES
325 CREAM (GRAM) TOPICAL 2 TIMES DAILY WITH MEALS
Qty: 60 TABLET | Refills: 1 | Status: ON HOLD | OUTPATIENT
Start: 2019-09-19 | End: 2019-10-18 | Stop reason: HOSPADM

## 2019-09-19 RX ORDER — LANOLIN ALCOHOL/MO/W.PET/CERES
325 CREAM (GRAM) TOPICAL 2 TIMES DAILY WITH MEALS
Status: DISCONTINUED | OUTPATIENT
Start: 2019-09-19 | End: 2019-09-19 | Stop reason: HOSPADM

## 2019-09-19 RX ADMIN — ONDANSETRON 4 MG: 2 INJECTION, SOLUTION INTRAMUSCULAR; INTRAVENOUS at 10:58

## 2019-09-19 RX ADMIN — PROPOFOL 10 MG: 10 INJECTION, EMULSION INTRAVENOUS at 10:53

## 2019-09-19 RX ADMIN — LIDOCAINE HYDROCHLORIDE 40 MG: 20 INJECTION, SOLUTION EPIDURAL; INFILTRATION; INTRACAUDAL; PERINEURAL at 10:48

## 2019-09-19 RX ADMIN — Medication 10 ML: at 09:36

## 2019-09-19 RX ADMIN — ONDANSETRON 4 MG: 2 INJECTION INTRAMUSCULAR; INTRAVENOUS at 06:00

## 2019-09-19 RX ADMIN — PANTOPRAZOLE SODIUM 40 MG: 40 INJECTION, POWDER, FOR SOLUTION INTRAVENOUS at 09:35

## 2019-09-19 RX ADMIN — SODIUM CHLORIDE: 9 INJECTION, SOLUTION INTRAVENOUS at 10:43

## 2019-09-19 RX ADMIN — LISINOPRIL 20 MG: 20 TABLET ORAL at 09:35

## 2019-09-19 RX ADMIN — PROPOFOL 10 MG: 10 INJECTION, EMULSION INTRAVENOUS at 10:52

## 2019-09-19 RX ADMIN — HYDRALAZINE HYDROCHLORIDE 10 MG: 20 INJECTION INTRAMUSCULAR; INTRAVENOUS at 00:53

## 2019-09-19 RX ADMIN — IRON SUCROSE 200 MG: 20 INJECTION, SOLUTION INTRAVENOUS at 09:35

## 2019-09-19 RX ADMIN — PROPOFOL 40 MG: 10 INJECTION, EMULSION INTRAVENOUS at 10:49

## 2019-09-19 RX ADMIN — INFLUENZA A VIRUS A/BRISBANE/02/2018 IVR-190 (H1N1) ANTIGEN (PROPIOLACTONE INACTIVATED), INFLUENZA A VIRUS A/KANSAS/14/2017 X-327 (H3N2) ANTIGEN (PROPIOLACTONE INACTIVATED), INFLUENZA B VIRUS B/MARYLAND/15/2016 ANTIGEN (PROPIOLACTONE INACTIVATED), INFLUENZA B VIRUS B/PHUKET/3073/2013 BVR-1B ANTIGEN (PROPIOLACTONE INACTIVATED) 0.5 ML: 15; 15; 15; 15 INJECTION, SUSPENSION INTRAMUSCULAR at 14:50

## 2019-09-19 RX ADMIN — AMLODIPINE BESYLATE 5 MG: 5 TABLET ORAL at 09:35

## 2019-09-19 ASSESSMENT — PULMONARY FUNCTION TESTS
PIF_VALUE: 2
PIF_VALUE: 1
PIF_VALUE: 5
PIF_VALUE: 1
PIF_VALUE: 2
PIF_VALUE: 1

## 2019-09-19 ASSESSMENT — PAIN SCALES - GENERAL
PAINLEVEL_OUTOF10: 0
PAINLEVEL_OUTOF10: 4
PAINLEVEL_OUTOF10: 0
PAINLEVEL_OUTOF10: 0
PAINLEVEL_OUTOF10: 4

## 2019-09-19 ASSESSMENT — PAIN DESCRIPTION - LOCATION
LOCATION: BUTTOCKS
LOCATION: BUTTOCKS

## 2019-09-19 NOTE — ANESTHESIA PRE PROCEDURE
09/19/2019    HCT 24.1 09/18/2019    MCV 98.8 09/18/2019    RDW 13.2 09/18/2019     09/18/2019       CMP:   Lab Results   Component Value Date     09/18/2019    K 4.4 09/18/2019     09/18/2019    CO2 22 09/18/2019    BUN 30 09/18/2019    CREATININE 1.78 09/18/2019    GFRAA 44 09/18/2019    LABGLOM 36 09/18/2019    GLUCOSE 96 09/18/2019    PROT 5.6 07/10/2014    CALCIUM 8.7 09/18/2019    BILITOT 0.7 03/24/2015    ALKPHOS 71 03/24/2015    AST 20 03/24/2015    ALT 39 03/24/2015       POC Tests: No results for input(s): POCGLU, POCNA, POCK, POCCL, POCBUN, POCHEMO, POCHCT in the last 72 hours. Coags:   Lab Results   Component Value Date    PROTIME 10.2 09/18/2019    INR 1.0 09/18/2019    APTT 22.7 09/17/2019       HCG (If Applicable): No results found for: PREGTESTUR, PREGSERUM, HCG, HCGQUANT     ABGs: No results found for: PHART, PO2ART, LES8WLY, KUM3VOH, BEART, L6POFFOW     Type & Screen (If Applicable):  No results found for: LABABO, LABRH    Anesthesia Evaluation    Airway: Mallampati: I  TM distance: >3 FB   Neck ROM: full  Mouth opening: > = 3 FB Dental:          Pulmonary:                              Cardiovascular:    (+) hypertension:,     (-)  angina and murmur      Rhythm: regular                      Neuro/Psych:               GI/Hepatic/Renal:             Endo/Other:                     Abdominal:           Vascular:                                        Anesthesia Plan      MAC and general     ASA 3             Anesthetic plan and risks discussed with patient.                       Reyna Lyle MD   9/19/2019

## 2019-09-19 NOTE — PLAN OF CARE
Problem: Falls - Risk of:  Goal: Will remain free from falls  Description  Will remain free from falls  9/19/2019 0125 by Lauren Allen RN  Outcome: Ongoing  Note:   Pt fall risk, fall band present, falling star, safety alarm activated and in use as needed. Bed in lowest position, call light within reach. Hourly rounding performed. Pt encouraged to use call light. See Anthony Pedro fall risk assessment. Patient offered toileting assistance during rounding. Hourly rounds performed.

## 2019-09-19 NOTE — DISCHARGE SUMMARY
esophagitis/duodenitis, stomach polyps and large hiatal hernia, and no active bleeding. Evidence of a small ulcer at the GE junction with nonobstructing Schatzki's ring    Assessment/plan    1. Severe iron deficiency anemia-no active bleeding on EGD, however there were evidence of duodenitis, a small ulcer and stomach polyps. Biopsies are pending. Continue iron p.o. and consider erythropoietin per nephrology outpatient. Hb 9.2 today  2. Acute esophagitis/duodenitis-continue PPI  3. History of TAVR-hold Plavix 3-4 more days then may resume, aspirin and Plavix  4. Hypothyroid  5.  hypertension- BP stable    Patient is stable for discharge home. Significant therapeutic interventions: 1 unit PRBC, IV iron, IV Protonix    Significant Diagnostic Studies:   Labs / Micro:  Hb 9.2 on 9/19/2019      Radiology:  Xr Abdomen (kub) (single Ap View)    Result Date: 9/18/2019  Nonspecific bowel gas pattern without evidence for obstruction. Consultations:    Consults:     Final Specialist Recommendations/Findings:   IP CONSULT TO INTERNAL MEDICINE  IP CONSULT TO GI  IP CONSULT TO GI      The patient was seen and examined on day of discharge and this discharge summary is in conjunction with any daily progress note from day of discharge.     Discharge plan:     Disposition: Home with [facility]    Physician Follow Up:     Anirudh De Luna MD  [phone]    Schedule an appointment as soon as possible for a visit in 1 week      Wyatt Chavez, [address] [facility] [place] [address]  [phone]    Schedule an appointment as soon as possible for a visit in 3 weeks         Requiring Further Evaluation/Follow Up POST HOSPITALIZATION/Incidental Findings: H/H in 1 week    Diet: cardiac diet    Activity: As tolerated    Instructions to Patient: Follow-up with PCP and GI and cardiology , resume Plavix in 3 to 4 days    Discharge Medications: Medication List      START taking these medications    ferrous sulfate 325 (65 Fe) MG EC tablet  Take 1 tablet by mouth 2 times daily (with meals)     pantoprazole 40 MG tablet  Commonly known as:  PROTONIX  Take 1 tablet by mouth 2 times daily (before meals)        CHANGE how you take these medications    clopidogrel 75 MG tablet  Commonly known as:  PLAVIX  Take 1 tablet by mouth daily Resume in 3-4 days  What changed:  additional instructions        CONTINUE taking these medications    amLODIPine 2.5 MG tablet  Commonly known as:  NORVASC     aspirin 81 MG tablet     ELIGARD SC     levothyroxine 100 MCG tablet  Commonly known as:  SYNTHROID  Take 1 tablet by mouth Daily     lisinopril 20 MG tablet  Commonly known as:  PRINIVIL;ZESTRIL     Misc. Devices Misc  Merna Stair Lift DX: Generalized weakness     pravastatin 20 MG tablet  Commonly known as:  PRAVACHOL  TAKE 1 TABLET DAILY     ranitidine 150 MG tablet  Commonly known as:  ZANTAC  TAKE 1 TABLET DAILY     tamsulosin 0.4 MG capsule  Commonly known as:  FLOMAX     tiZANidine 2 MG tablet  Commonly known as:  ZANAFLEX     traMADol 50 MG tablet  Commonly known as:  ULTRAM        STOP taking these medications    acetaminophen 500 MG tablet  Commonly known as:  TYLENOL     meloxicam 15 MG tablet  Commonly known as:  MOBIC     RABEprazole 20 MG tablet  Commonly known as:  ACIPHEX           Where to Get Your Medications      You can get these medications from any pharmacy    Bring a paper prescription for each of these medications  · ferrous sulfate 325 (65 Fe) MG EC tablet  · pantoprazole 40 MG tablet     Information about where to get these medications is not yet available    Ask your nurse or doctor about these medications  · clopidogrel 75 MG tablet         Time Spent on discharge is  32 mins in patient examination, evaluation, counseling as well as medication reconciliation, prescriptions for required medications, discharge plan and follow up.     Electronically signed by   Catherine Perez MD  9/19/2019  2:05 PM      Thank you Dr. Leandro Carter MD for the opportunity to be involved in this patient's care.

## 2019-09-19 NOTE — PROGRESS NOTES
Occupational Therapy   Occupational Therapy Initial Assessment  Date: 2019   Patient Name: Nat Devine  MRN: [id number]     : 1929    Date of Service: 2019    Discharge Recommendations:  [address]  OT Equipment Recommendations  Equipment Needed: No    RN reports patient is medically stable for therapy treatment this date. Chart reviewed prior to treatment and patient is agreeable for therapy. All lines intact and patient positioned comfortably at end of treatment. All patient needs addressed prior to ending therapy session. Assessment   Performance deficits / Impairments: Decreased functional mobility ; Decreased safe awareness;Decreased balance;Decreased ADL status; Decreased ROM; Decreased strength;Decreased endurance  Prognosis: Good  Decision Making: Medium Complexity  OT Education: OT Role;Plan of Care  Patient Education: DC recommendation, walker safety, hand placement during ADL transfers, fall prevention techs, benefits of moving and being OOB, call light use. REQUIRES OT FOLLOW UP: Yes  Activity Tolerance  Activity Tolerance: Patient Tolerated treatment well  Activity Tolerance: Pt limited by endurance  Safety Devices  Safety Devices in place: Yes  Type of devices: Nurse notified;Gait belt;Patient at risk for falls; Left in chair;Chair alarm in place;Call light within reach           Patient Diagnosis(es): The primary encounter diagnosis was Anemia, unspecified type. A diagnosis of FARHAN (acute kidney injury) ([facility] [address] was also pertinent to this visit. has a past medical history of Adenocarcinoma of prostate ([address] Arthritis, Chronic back pain, CRF (chronic renal failure), Hypertension, Renal insufficiency, and Thyroid disease. has a past surgical history that includes Thyroidectomy; hernia repair; joint replacement (Bilateral, knees); back surgery; Ulnar tunnel release; shoulder surgery (Right); and Upper gastrointestinal endoscopy (14). understanding of education provided on fall prevention techs. Patient Goals   Patient goals : to go home       Therapy Time   Individual Concurrent Group Co-treatment   Time In 0805(+10 min chart review/ RN communication)         Time Out 0900         Minutes 55         Timed Code Treatment Minutes: 40 Minutes     Patient would benefit from SNF for continued occupational therapy to increase independence with  ADL of bathing, dressing, toileting and grooming. Writer recommending SNF placement for for activity tolerance and strength which will increase independence with ADL's coordinated with bed mobility and chair transfers. Continued skilled OT services to address decreased safety awareness with ADL and IADL tasks and for education and increased independence with DME and AE for fall prevention and ec/ws techniques prior to d/c home.     Wanda Alvarado

## 2019-09-19 NOTE — PLAN OF CARE
Problem: Falls - Risk of:  Goal: Will remain free from falls  Description  Will remain free from falls. Fall risk assessment completed. Patient instructed to use call light. Bed locked and in lowest position, side rails up 2/4, call light and bedside table within reach, clutter removed, and non-skid footwear on when pt out of bed. Bed alarm in use. Hourly rounds will continue. 9/19/2019 1105 by Eun Shah RN  Outcome: Ongoing     Problem: Bleeding:  Goal: Will show no signs and symptoms of excessive bleeding  Description  Will show no signs and symptoms of excessive bleeding. No s/s of bleeding at this time. Hgb stable and EGD showed no evidence of bleed. Will continue to monitor.    Outcome: Ongoing

## 2019-09-19 NOTE — PROGRESS NOTES
Patient return to room 1006-2 from PACU. VS taken, telemetry placed and call light given/within reach. Patient A&O. Orders released/reviewed,  Re-assessment completed. Dr. Mary Ann Gomez on the unit at this time. Possible DC today. See chart for more detail.

## 2019-09-20 LAB — SURGICAL PATHOLOGY REPORT: NORMAL

## 2019-09-23 ENCOUNTER — TELEPHONE (OUTPATIENT)
Dept: INTERNAL MEDICINE CLINIC | Age: 84
End: 2019-09-23

## 2019-09-23 NOTE — TELEPHONE ENCOUNTER
[facility] Transitions Initial Follow Up Call    Outreach made within 2 business days of discharge: Yes    Patient: Rosana Akins Patient : 1929   MRN: [id number]  Reason for Admission: There are no discharge diagnoses documented for the most recent discharge. Discharge Date: 19       Spoke with: patient    Discharge department/facility: [facility] Interactive Patient Contact:  Was patient able to fill all prescriptions: Yes  Was patient instructed to bring all medications to the follow-up visit: Yes  Is patient taking all medications as directed in the discharge summary?  Yes  Does patient understand their discharge instructions: Yes  Does patient have questions or concerns that need addressed prior to 7-14 day follow up office visit: no    Scheduled appointment with PCP within 7-14 days    Follow Up  Future Appointments   Date Time Provider Fritz Muhammad   2019  3:15 PM MD Ashlyn Harris   2019  2:00 PM MD Ashlyn Harris RN

## 2019-09-25 ENCOUNTER — TELEPHONE (OUTPATIENT)
Dept: INTERNAL MEDICINE CLINIC | Age: 84
End: 2019-09-25

## 2019-09-30 ENCOUNTER — OFFICE VISIT (OUTPATIENT)
Dept: INTERNAL MEDICINE CLINIC | Age: 84
End: 2019-09-30
Payer: COMMERCIAL

## 2019-09-30 VITALS
WEIGHT: 159 LBS | HEART RATE: 89 BPM | DIASTOLIC BLOOD PRESSURE: 78 MMHG | HEIGHT: 72 IN | OXYGEN SATURATION: 98 % | BODY MASS INDEX: 21.54 KG/M2 | SYSTOLIC BLOOD PRESSURE: 118 MMHG | TEMPERATURE: 97.9 F

## 2019-09-30 DIAGNOSIS — R11.0 NAUSEA: ICD-10-CM

## 2019-09-30 DIAGNOSIS — D64.9 ANEMIA, UNSPECIFIED TYPE: Primary | ICD-10-CM

## 2019-09-30 DIAGNOSIS — K59.00 CONSTIPATION, UNSPECIFIED CONSTIPATION TYPE: ICD-10-CM

## 2019-09-30 PROCEDURE — 1111F DSCHRG MED/CURRENT MED MERGE: CPT | Performed by: INTERNAL MEDICINE

## 2019-09-30 PROCEDURE — 99495 TRANSJ CARE MGMT MOD F2F 14D: CPT | Performed by: INTERNAL MEDICINE

## 2019-09-30 RX ORDER — ONDANSETRON 4 MG/1
4 TABLET, FILM COATED ORAL 3 TIMES DAILY PRN
Qty: 90 TABLET | Refills: 0 | Status: SHIPPED | OUTPATIENT
Start: 2019-09-30 | End: 2020-08-14 | Stop reason: SDUPTHER

## 2019-09-30 RX ORDER — SENNA PLUS 8.6 MG/1
1 TABLET ORAL 2 TIMES DAILY
Qty: 60 TABLET | Refills: 11 | Status: ON HOLD | OUTPATIENT
Start: 2019-09-30 | End: 2019-10-05 | Stop reason: SDUPTHER

## 2019-09-30 NOTE — PROGRESS NOTES
Nasreen Mckeon is a 80 y.o. male who presents for   Chief Complaint   Patient presents with    Follow-Up from [facility] from [facility] on 9/19 for severe anemia. Pt wants to discuss taking OTC iron 25mg- say its better on his stomach than the iron tablets prescribed at discharge    and follow up of chronic medical problems. 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 ([address]    S/P TAVR (transcatheter aortic valve replacement)    Anemia    Weight loss    Other fatigue    Weakness    Duodenitis    Esophagitis    Large hiatal hernia     HPI  Here for follow-up from the hospital admission for anemia and patient states he is still having problems with tiredness and also constipation and nausea    Current Outpatient Medications   Medication Sig Dispense Refill    ondansetron (ZOFRAN) 4 MG tablet Take 1 tablet by mouth 3 times daily as needed for Nausea or Vomiting 90 tablet 0    senna (SENOKOT) 8.6 MG tablet Take 1 tablet by mouth 2 times daily 60 tablet 11    clopidogrel (PLAVIX) 75 MG tablet Take 1 tablet by mouth daily Resume in 3-4 days 30 tablet 3    pantoprazole (PROTONIX) 40 MG tablet Take 1 tablet by mouth 2 times daily (before meals) 60 tablet 1    Misc. Devices MISC [place] Stair Lift DX: Generalized weakness 1 Device 0    Leuprolide Acetate (ELIGARD SC) Inject into the skin Twice yearly at MD office      pravastatin (PRAVACHOL) 20 MG tablet TAKE 1 TABLET DAILY 90 tablet 1    levothyroxine (SYNTHROID) 100 MCG tablet Take 1 tablet by mouth Daily 90 tablet 1    ranitidine (ZANTAC) 150 MG tablet TAKE 1 TABLET DAILY 90 tablet 1    traMADol (ULTRAM) 50 MG tablet Take 1 tablet by mouth every 8 hours as needed for Pain.        tiZANidine (ZANAFLEX) 2 MG tablet Take 2 mg by mouth every 6 hours as hematuria   Musculoskeletal: No arthralgias, back pain or myalgias   Skin                  : Negative for rash or erythema   Neurological    : Negative for dizziness, weakness, tremors ,light headedness or syncope   Psychiatric       : Negative for dysphoric mood, sleep disturbances, nervous or anxious, or decreased concentration   All other review of systems was negative    Objective  Physical Examination:    Nursing note reviewed    /78 (Site: Right Upper Arm, Position: Sitting, Cuff Size: Large Adult)   Pulse 89   Temp 97.9 °F (36.6 °C)   Ht 6' 0.01\" (1.829 m)   Wt 159 lb (72.1 kg)   SpO2 98%   BMI 21.56 kg/m²   BP Readings from Last 3 Encounters:   09/30/19 118/78   09/19/19 (!) 156/69   09/19/19 (!) 184/88         Constitutional:  Inga Heath is oriented to place, person and time ,appears well-developed and well-nourished  HEENT:  Atraumatic and normocephalic, external ears normal bilaterally, nose normal no oropharyngeal exudate and is clear and moist  Eyes:  EOCM normal; conjunctivae normal; PERRLA bilaterally  Neck:  Normal range of motion, neck supple, no JVD and no thyromegaly  Cardiovascular:  RRR, normal heart sounds and intact distal pulses  Pulmonary:  effort normal and breath sounds normal bilaterally,no wheezes or rales, no respiratory distress  Abdominal:  Soft, non-tender; normal bowel sounds, no masses  Musculoskeletal:  Normal range of motion and no edema or tenderness bilaterally  No lymphadenopathy  Neurological:  alert, oriented, and normal reflexes bilaterally  Skin: warm and dry  Psychiatric:  normal mood and effect; behavior normal.    Labs:   No results found for: LABA1C  Lab Results   Component Value Date    CHOL 182 08/04/2017     Lab Results   Component Value Date    HDL 45 08/04/2017     Lab Results   Component Value Date    LDLCALC 93 08/04/2017     Lab Results   Component Value Date    TRIG 220 08/04/2017     No components found for: [place], [place]  Lab Results   Component Value

## 2019-10-02 ENCOUNTER — TELEPHONE (OUTPATIENT)
Dept: GASTROENTEROLOGY | Age: 84
End: 2019-10-02

## 2019-10-04 ENCOUNTER — APPOINTMENT (OUTPATIENT)
Dept: CT IMAGING | Age: 84
DRG: 684 | End: 2019-10-04
Payer: COMMERCIAL

## 2019-10-04 ENCOUNTER — HOSPITAL ENCOUNTER (INPATIENT)
Age: 84
LOS: 1 days | Discharge: HOME | DRG: 684 | End: 2019-10-05
Attending: EMERGENCY MEDICINE | Admitting: INTERNAL MEDICINE
Payer: COMMERCIAL

## 2019-10-04 DIAGNOSIS — N18.9 ACUTE KIDNEY INJURY SUPERIMPOSED ON CHRONIC KIDNEY DISEASE (HCC): Primary | ICD-10-CM

## 2019-10-04 DIAGNOSIS — N17.9 ACUTE KIDNEY INJURY SUPERIMPOSED ON CHRONIC KIDNEY DISEASE (HCC): Primary | ICD-10-CM

## 2019-10-04 DIAGNOSIS — R19.7 NAUSEA VOMITING AND DIARRHEA: ICD-10-CM

## 2019-10-04 DIAGNOSIS — R11.2 NAUSEA VOMITING AND DIARRHEA: ICD-10-CM

## 2019-10-04 LAB
% CKMB: 4.3 % (ref 0–3.5)
-: ABNORMAL
ABSOLUTE EOS #: 0.03 K/UL (ref 0–0.44)
ABSOLUTE IMMATURE GRANULOCYTE: 0.07 K/UL (ref 0–0.3)
ABSOLUTE LYMPH #: 0.87 K/UL (ref 1.1–3.7)
ABSOLUTE MONO #: 1.02 K/UL (ref 0.1–1.2)
ALBUMIN SERPL-MCNC: 4.1 G/DL (ref 3.5–5.2)
ALBUMIN/GLOBULIN RATIO: ABNORMAL (ref 1–2.5)
ALP BLD-CCNC: 54 U/L (ref 40–129)
ALT SERPL-CCNC: 10 U/L (ref 5–41)
AMORPHOUS: ABNORMAL
ANION GAP SERPL CALCULATED.3IONS-SCNC: 14 MMOL/L (ref 9–17)
AST SERPL-CCNC: 18 U/L
BACTERIA: ABNORMAL
BASOPHILS # BLD: 0 % (ref 0–2)
BASOPHILS ABSOLUTE: 0.04 K/UL (ref 0–0.2)
BILIRUB SERPL-MCNC: 0.42 MG/DL (ref 0.3–1.2)
BILIRUBIN DIRECT: 0.1 MG/DL
BILIRUBIN URINE: NEGATIVE
BILIRUBIN, INDIRECT: 0.32 MG/DL (ref 0–1)
BUN BLDV-MCNC: 40 MG/DL (ref 8–23)
BUN/CREAT BLD: 19 (ref 9–20)
CALCIUM SERPL-MCNC: 9.2 MG/DL (ref 8.6–10.4)
CASTS UA: ABNORMAL /LPF
CHLORIDE BLD-SCNC: 107 MMOL/L (ref 98–107)
CK MB: 3.4 NG/ML
CKMB INTERPRETATION: ABNORMAL
CO2: 21 MMOL/L (ref 20–31)
COLOR: YELLOW
COMMENT UA: ABNORMAL
CREAT SERPL-MCNC: 2.06 MG/DL (ref 0.7–1.2)
CRYSTALS, UA: ABNORMAL /HPF
DIFFERENTIAL TYPE: ABNORMAL
EOSINOPHILS RELATIVE PERCENT: 0 % (ref 1–4)
EPITHELIAL CELLS UA: ABNORMAL /HPF (ref 0–5)
FERRITIN: 322 UG/L (ref 30–400)
GFR AFRICAN AMERICAN: 37 ML/MIN
GFR NON-AFRICAN AMERICAN: 30 ML/MIN
GFR SERPL CREATININE-BSD FRML MDRD: ABNORMAL ML/MIN/{1.73_M2}
GFR SERPL CREATININE-BSD FRML MDRD: ABNORMAL ML/MIN/{1.73_M2}
GLOBULIN: ABNORMAL G/DL (ref 1.5–3.8)
GLUCOSE BLD-MCNC: 115 MG/DL (ref 70–99)
GLUCOSE URINE: NEGATIVE
HCT VFR BLD CALC: 34.1 % (ref 40.7–50.3)
HEMOGLOBIN: 10.7 G/DL (ref 13–17)
IMMATURE GRANULOCYTES: 1 %
INR BLD: 1
IRON SATURATION: 20 % (ref 20–55)
IRON: 51 UG/DL (ref 59–158)
IRON: 51 UG/DL (ref 59–158)
KETONES, URINE: NEGATIVE
LEUKOCYTE ESTERASE, URINE: NEGATIVE
LIPASE: 29 U/L (ref 13–60)
LYMPHOCYTES # BLD: 6 % (ref 24–43)
MAGNESIUM: 1.9 MG/DL (ref 1.6–2.6)
MCH RBC QN AUTO: 30.4 PG (ref 25.2–33.5)
MCHC RBC AUTO-ENTMCNC: 31.4 G/DL (ref 28.4–34.8)
MCV RBC AUTO: 96.9 FL (ref 82.6–102.9)
MONOCYTES # BLD: 7 % (ref 3–12)
MUCUS: ABNORMAL
MYOGLOBIN: 154 NG/ML (ref 28–72)
NITRITE, URINE: NEGATIVE
NRBC AUTOMATED: 0 PER 100 WBC
OTHER OBSERVATIONS UA: ABNORMAL
PARTIAL THROMBOPLASTIN TIME: 21.9 SEC (ref 23–31)
PDW BLD-RTO: 12.9 % (ref 11.8–14.4)
PH UA: 5.5 (ref 5–8)
PLATELET # BLD: 161 K/UL (ref 138–453)
PLATELET ESTIMATE: ABNORMAL
PMV BLD AUTO: 10.6 FL (ref 8.1–13.5)
POTASSIUM SERPL-SCNC: 3.9 MMOL/L (ref 3.7–5.3)
PROTEIN UA: ABNORMAL
PROTHROMBIN TIME: 10.1 SEC (ref 9.7–11.6)
RBC # BLD: 3.52 M/UL (ref 4.21–5.77)
RBC # BLD: ABNORMAL 10*6/UL
RBC UA: ABNORMAL /HPF (ref 0–2)
RENAL EPITHELIAL, UA: ABNORMAL /HPF
SEG NEUTROPHILS: 86 % (ref 36–65)
SEGMENTED NEUTROPHILS ABSOLUTE COUNT: 12.35 K/UL (ref 1.5–8.1)
SODIUM BLD-SCNC: 142 MMOL/L (ref 135–144)
SPECIFIC GRAVITY UA: 1.02 (ref 1–1.03)
TOTAL CK: 80 U/L (ref 39–308)
TOTAL IRON BINDING CAPACITY: 261 UG/DL (ref 250–450)
TOTAL PROTEIN: 6.1 G/DL (ref 6.4–8.3)
TRICHOMONAS: ABNORMAL
TROPONIN INTERP: ABNORMAL
TROPONIN T: ABNORMAL NG/ML
TROPONIN, HIGH SENSITIVITY: 119 NG/L (ref 0–22)
TURBIDITY: CLEAR
UNSATURATED IRON BINDING CAPACITY: 210 UG/DL (ref 112–347)
URINE HGB: NEGATIVE
UROBILINOGEN, URINE: NORMAL
WBC # BLD: 14.4 K/UL (ref 3.5–11.3)
WBC # BLD: ABNORMAL 10*3/UL
WBC UA: ABNORMAL /HPF (ref 0–5)
YEAST: ABNORMAL

## 2019-10-04 PROCEDURE — 84484 ASSAY OF TROPONIN QUANT: CPT

## 2019-10-04 PROCEDURE — 99222 1ST HOSP IP/OBS MODERATE 55: CPT | Performed by: INTERNAL MEDICINE

## 2019-10-04 PROCEDURE — 2580000003 HC RX 258: Performed by: EMERGENCY MEDICINE

## 2019-10-04 PROCEDURE — 80076 HEPATIC FUNCTION PANEL: CPT

## 2019-10-04 PROCEDURE — 83550 IRON BINDING TEST: CPT

## 2019-10-04 PROCEDURE — 83874 ASSAY OF MYOGLOBIN: CPT

## 2019-10-04 PROCEDURE — 96374 THER/PROPH/DIAG INJ IV PUSH: CPT

## 2019-10-04 PROCEDURE — 82550 ASSAY OF CK (CPK): CPT

## 2019-10-04 PROCEDURE — 6360000002 HC RX W HCPCS: Performed by: EMERGENCY MEDICINE

## 2019-10-04 PROCEDURE — 82728 ASSAY OF FERRITIN: CPT

## 2019-10-04 PROCEDURE — 85610 PROTHROMBIN TIME: CPT

## 2019-10-04 PROCEDURE — 83735 ASSAY OF MAGNESIUM: CPT

## 2019-10-04 PROCEDURE — 83690 ASSAY OF LIPASE: CPT

## 2019-10-04 PROCEDURE — 80048 BASIC METABOLIC PNL TOTAL CA: CPT

## 2019-10-04 PROCEDURE — 85730 THROMBOPLASTIN TIME PARTIAL: CPT

## 2019-10-04 PROCEDURE — 85025 COMPLETE CBC W/AUTO DIFF WBC: CPT

## 2019-10-04 PROCEDURE — 81001 URINALYSIS AUTO W/SCOPE: CPT

## 2019-10-04 PROCEDURE — C9113 INJ PANTOPRAZOLE SODIUM, VIA: HCPCS | Performed by: EMERGENCY MEDICINE

## 2019-10-04 PROCEDURE — 83540 ASSAY OF IRON: CPT

## 2019-10-04 PROCEDURE — 96361 HYDRATE IV INFUSION ADD-ON: CPT

## 2019-10-04 PROCEDURE — 99285 EMERGENCY DEPT VISIT HI MDM: CPT

## 2019-10-04 PROCEDURE — 6370000000 HC RX 637 (ALT 250 FOR IP): Performed by: INTERNAL MEDICINE

## 2019-10-04 PROCEDURE — 6360000002 HC RX W HCPCS: Performed by: INTERNAL MEDICINE

## 2019-10-04 PROCEDURE — 74176 CT ABD & PELVIS W/O CONTRAST: CPT

## 2019-10-04 PROCEDURE — 99223 1ST HOSP IP/OBS HIGH 75: CPT | Performed by: INTERNAL MEDICINE

## 2019-10-04 PROCEDURE — 2580000003 HC RX 258: Performed by: INTERNAL MEDICINE

## 2019-10-04 PROCEDURE — 93005 ELECTROCARDIOGRAM TRACING: CPT | Performed by: EMERGENCY MEDICINE

## 2019-10-04 PROCEDURE — 82553 CREATINE MB FRACTION: CPT

## 2019-10-04 PROCEDURE — 2060000000 HC ICU INTERMEDIATE R&B

## 2019-10-04 RX ORDER — SODIUM CHLORIDE 0.9 % (FLUSH) 0.9 %
10 SYRINGE (ML) INJECTION EVERY 12 HOURS SCHEDULED
Status: DISCONTINUED | OUTPATIENT
Start: 2019-10-04 | End: 2019-10-04 | Stop reason: SDUPTHER

## 2019-10-04 RX ORDER — 0.9 % SODIUM CHLORIDE 0.9 %
1000 INTRAVENOUS SOLUTION INTRAVENOUS ONCE
Status: COMPLETED | OUTPATIENT
Start: 2019-10-04 | End: 2019-10-04

## 2019-10-04 RX ORDER — SODIUM CHLORIDE 0.9 % (FLUSH) 0.9 %
10 SYRINGE (ML) INJECTION PRN
Status: DISCONTINUED | OUTPATIENT
Start: 2019-10-04 | End: 2019-10-04 | Stop reason: SDUPTHER

## 2019-10-04 RX ORDER — SODIUM CHLORIDE 0.9 % (FLUSH) 0.9 %
10 SYRINGE (ML) INJECTION EVERY 12 HOURS SCHEDULED
Status: DISCONTINUED | OUTPATIENT
Start: 2019-10-04 | End: 2019-10-05 | Stop reason: HOSPADM

## 2019-10-04 RX ORDER — FAMOTIDINE 20 MG/1
20 TABLET, FILM COATED ORAL DAILY
Status: DISCONTINUED | OUTPATIENT
Start: 2019-10-04 | End: 2019-10-05 | Stop reason: HOSPADM

## 2019-10-04 RX ORDER — LANOLIN ALCOHOL/MO/W.PET/CERES
325 CREAM (GRAM) TOPICAL 2 TIMES DAILY WITH MEALS
Status: DISCONTINUED | OUTPATIENT
Start: 2019-10-04 | End: 2019-10-05 | Stop reason: HOSPADM

## 2019-10-04 RX ORDER — SODIUM CHLORIDE 9 MG/ML
INJECTION, SOLUTION INTRAVENOUS CONTINUOUS
Status: DISCONTINUED | OUTPATIENT
Start: 2019-10-04 | End: 2019-10-04

## 2019-10-04 RX ORDER — SODIUM CHLORIDE 9 MG/ML
INJECTION, SOLUTION INTRAVENOUS CONTINUOUS
Status: DISCONTINUED | OUTPATIENT
Start: 2019-10-04 | End: 2019-10-05 | Stop reason: HOSPADM

## 2019-10-04 RX ORDER — PANTOPRAZOLE SODIUM 40 MG/10ML
40 INJECTION, POWDER, LYOPHILIZED, FOR SOLUTION INTRAVENOUS ONCE
Status: COMPLETED | OUTPATIENT
Start: 2019-10-04 | End: 2019-10-04

## 2019-10-04 RX ORDER — LEVOTHYROXINE SODIUM 0.1 MG/1
100 TABLET ORAL DAILY
Status: DISCONTINUED | OUTPATIENT
Start: 2019-10-04 | End: 2019-10-05 | Stop reason: HOSPADM

## 2019-10-04 RX ORDER — ACETAMINOPHEN 325 MG/1
650 TABLET ORAL EVERY 4 HOURS PRN
Status: DISCONTINUED | OUTPATIENT
Start: 2019-10-04 | End: 2019-10-05 | Stop reason: HOSPADM

## 2019-10-04 RX ORDER — AMLODIPINE BESYLATE 2.5 MG/1
2.5 TABLET ORAL DAILY
Status: DISCONTINUED | OUTPATIENT
Start: 2019-10-04 | End: 2019-10-05

## 2019-10-04 RX ORDER — CLOPIDOGREL BISULFATE 75 MG/1
75 TABLET ORAL DAILY
Status: DISCONTINUED | OUTPATIENT
Start: 2019-10-04 | End: 2019-10-05 | Stop reason: HOSPADM

## 2019-10-04 RX ORDER — ONDANSETRON 2 MG/ML
4 INJECTION INTRAMUSCULAR; INTRAVENOUS EVERY 6 HOURS PRN
Status: DISCONTINUED | OUTPATIENT
Start: 2019-10-04 | End: 2019-10-05 | Stop reason: HOSPADM

## 2019-10-04 RX ORDER — ONDANSETRON 2 MG/ML
4 INJECTION INTRAMUSCULAR; INTRAVENOUS EVERY 6 HOURS PRN
Status: DISCONTINUED | OUTPATIENT
Start: 2019-10-04 | End: 2019-10-04 | Stop reason: SDUPTHER

## 2019-10-04 RX ORDER — SODIUM CHLORIDE 0.9 % (FLUSH) 0.9 %
10 SYRINGE (ML) INJECTION PRN
Status: DISCONTINUED | OUTPATIENT
Start: 2019-10-04 | End: 2019-10-05 | Stop reason: HOSPADM

## 2019-10-04 RX ORDER — TIZANIDINE 2 MG/1
2 TABLET ORAL EVERY 6 HOURS PRN
Status: DISCONTINUED | OUTPATIENT
Start: 2019-10-04 | End: 2019-10-05 | Stop reason: HOSPADM

## 2019-10-04 RX ORDER — ASPIRIN 81 MG/1
81 TABLET, CHEWABLE ORAL DAILY
Status: DISCONTINUED | OUTPATIENT
Start: 2019-10-04 | End: 2019-10-05 | Stop reason: HOSPADM

## 2019-10-04 RX ORDER — 0.9 % SODIUM CHLORIDE 0.9 %
10 VIAL (ML) INJECTION ONCE
Status: COMPLETED | OUTPATIENT
Start: 2019-10-04 | End: 2019-10-04

## 2019-10-04 RX ORDER — SENNA PLUS 8.6 MG/1
1 TABLET ORAL 2 TIMES DAILY
Status: DISCONTINUED | OUTPATIENT
Start: 2019-10-04 | End: 2019-10-05 | Stop reason: HOSPADM

## 2019-10-04 RX ORDER — TRAMADOL HYDROCHLORIDE 50 MG/1
50 TABLET ORAL EVERY 8 HOURS PRN
Status: DISCONTINUED | OUTPATIENT
Start: 2019-10-04 | End: 2019-10-05 | Stop reason: HOSPADM

## 2019-10-04 RX ORDER — ONDANSETRON 2 MG/ML
4 INJECTION INTRAMUSCULAR; INTRAVENOUS ONCE
Status: COMPLETED | OUTPATIENT
Start: 2019-10-04 | End: 2019-10-04

## 2019-10-04 RX ORDER — ONDANSETRON 4 MG/1
4 TABLET, ORALLY DISINTEGRATING ORAL EVERY 6 HOURS PRN
Status: DISCONTINUED | OUTPATIENT
Start: 2019-10-04 | End: 2019-10-05 | Stop reason: HOSPADM

## 2019-10-04 RX ORDER — PRAVASTATIN SODIUM 20 MG
20 TABLET ORAL DAILY
Status: DISCONTINUED | OUTPATIENT
Start: 2019-10-04 | End: 2019-10-05 | Stop reason: HOSPADM

## 2019-10-04 RX ORDER — TAMSULOSIN HYDROCHLORIDE 0.4 MG/1
0.4 CAPSULE ORAL DAILY
Status: DISCONTINUED | OUTPATIENT
Start: 2019-10-04 | End: 2019-10-05 | Stop reason: HOSPADM

## 2019-10-04 RX ADMIN — ACETAMINOPHEN 650 MG: 325 TABLET ORAL at 12:02

## 2019-10-04 RX ADMIN — AMLODIPINE BESYLATE 2.5 MG: 2.5 TABLET ORAL at 12:05

## 2019-10-04 RX ADMIN — Medication 10 ML: at 09:39

## 2019-10-04 RX ADMIN — ENOXAPARIN SODIUM 40 MG: 40 INJECTION SUBCUTANEOUS at 12:02

## 2019-10-04 RX ADMIN — FAMOTIDINE 20 MG: 20 TABLET ORAL at 12:03

## 2019-10-04 RX ADMIN — PRAVASTATIN SODIUM 20 MG: 20 TABLET ORAL at 12:05

## 2019-10-04 RX ADMIN — TAMSULOSIN HYDROCHLORIDE 0.4 MG: 0.4 CAPSULE ORAL at 12:05

## 2019-10-04 RX ADMIN — FERROUS SULFATE TAB EC 325 MG (65 MG FE EQUIVALENT) 325 MG: 325 (65 FE) TABLET DELAYED RESPONSE at 12:04

## 2019-10-04 RX ADMIN — LEVOTHYROXINE SODIUM 100 MCG: 100 TABLET ORAL at 12:02

## 2019-10-04 RX ADMIN — SODIUM CHLORIDE, PRESERVATIVE FREE 10 ML: 5 INJECTION INTRAVENOUS at 12:06

## 2019-10-04 RX ADMIN — SODIUM CHLORIDE 1000 ML: 9 INJECTION, SOLUTION INTRAVENOUS at 08:39

## 2019-10-04 RX ADMIN — PANTOPRAZOLE SODIUM 40 MG: 40 INJECTION, POWDER, FOR SOLUTION INTRAVENOUS at 09:39

## 2019-10-04 RX ADMIN — ACETAMINOPHEN 650 MG: 325 TABLET ORAL at 20:15

## 2019-10-04 RX ADMIN — ONDANSETRON 4 MG: 2 INJECTION INTRAMUSCULAR; INTRAVENOUS at 08:37

## 2019-10-04 RX ADMIN — CLOPIDOGREL BISULFATE 75 MG: 75 TABLET ORAL at 12:03

## 2019-10-04 RX ADMIN — ONDANSETRON 4 MG: 2 INJECTION INTRAMUSCULAR; INTRAVENOUS at 23:29

## 2019-10-04 RX ADMIN — SODIUM CHLORIDE: 9 INJECTION, SOLUTION INTRAVENOUS at 12:05

## 2019-10-04 RX ADMIN — FERROUS SULFATE TAB EC 325 MG (65 MG FE EQUIVALENT) 325 MG: 325 (65 FE) TABLET DELAYED RESPONSE at 18:14

## 2019-10-04 RX ADMIN — SODIUM CHLORIDE: 9 INJECTION, SOLUTION INTRAVENOUS at 23:34

## 2019-10-04 RX ADMIN — ASPIRIN 81 MG 81 MG: 81 TABLET ORAL at 12:03

## 2019-10-04 ASSESSMENT — ENCOUNTER SYMPTOMS
CONSTIPATION: 1
NAUSEA: 1
VOMITING: 1
BLOOD IN STOOL: 0
DIARRHEA: 1

## 2019-10-04 ASSESSMENT — PAIN DESCRIPTION - DESCRIPTORS: DESCRIPTORS: ACHING

## 2019-10-04 ASSESSMENT — PAIN SCALES - GENERAL
PAINLEVEL_OUTOF10: 0
PAINLEVEL_OUTOF10: 0
PAINLEVEL_OUTOF10: 7
PAINLEVEL_OUTOF10: 7
PAINLEVEL_OUTOF10: 3
PAINLEVEL_OUTOF10: 0
PAINLEVEL_OUTOF10: 7
PAINLEVEL_OUTOF10: 2

## 2019-10-04 ASSESSMENT — PAIN DESCRIPTION - PAIN TYPE: TYPE: CHRONIC PAIN

## 2019-10-04 ASSESSMENT — PAIN DESCRIPTION - FREQUENCY: FREQUENCY: INTERMITTENT

## 2019-10-04 ASSESSMENT — PAIN DESCRIPTION - LOCATION: LOCATION: BACK

## 2019-10-05 VITALS
BODY MASS INDEX: 23.31 KG/M2 | SYSTOLIC BLOOD PRESSURE: 158 MMHG | WEIGHT: 172.1 LBS | RESPIRATION RATE: 18 BRPM | TEMPERATURE: 98.2 F | DIASTOLIC BLOOD PRESSURE: 65 MMHG | HEART RATE: 72 BPM | OXYGEN SATURATION: 96 % | HEIGHT: 72 IN

## 2019-10-05 LAB
ANION GAP SERPL CALCULATED.3IONS-SCNC: 13 MMOL/L (ref 9–17)
BUN BLDV-MCNC: 37 MG/DL (ref 8–23)
BUN/CREAT BLD: 17 (ref 9–20)
CALCIUM SERPL-MCNC: 8.6 MG/DL (ref 8.6–10.4)
CHLORIDE BLD-SCNC: 110 MMOL/L (ref 98–107)
CO2: 20 MMOL/L (ref 20–31)
CREAT SERPL-MCNC: 2.18 MG/DL (ref 0.7–1.2)
EKG ATRIAL RATE: 89 BPM
EKG P AXIS: 49 DEGREES
EKG P-R INTERVAL: 160 MS
EKG Q-T INTERVAL: 390 MS
EKG QRS DURATION: 104 MS
EKG QTC CALCULATION (BAZETT): 474 MS
EKG R AXIS: 41 DEGREES
EKG T AXIS: 61 DEGREES
EKG VENTRICULAR RATE: 89 BPM
GFR AFRICAN AMERICAN: 35 ML/MIN
GFR NON-AFRICAN AMERICAN: 29 ML/MIN
GFR SERPL CREATININE-BSD FRML MDRD: ABNORMAL ML/MIN/{1.73_M2}
GFR SERPL CREATININE-BSD FRML MDRD: ABNORMAL ML/MIN/{1.73_M2}
GLUCOSE BLD-MCNC: 90 MG/DL (ref 70–99)
POTASSIUM SERPL-SCNC: 4.1 MMOL/L (ref 3.7–5.3)
SODIUM BLD-SCNC: 143 MMOL/L (ref 135–144)

## 2019-10-05 PROCEDURE — 6370000000 HC RX 637 (ALT 250 FOR IP): Performed by: INTERNAL MEDICINE

## 2019-10-05 PROCEDURE — 80048 BASIC METABOLIC PNL TOTAL CA: CPT

## 2019-10-05 PROCEDURE — 99232 SBSQ HOSP IP/OBS MODERATE 35: CPT | Performed by: INTERNAL MEDICINE

## 2019-10-05 PROCEDURE — 36415 COLL VENOUS BLD VENIPUNCTURE: CPT

## 2019-10-05 PROCEDURE — 2580000003 HC RX 258: Performed by: INTERNAL MEDICINE

## 2019-10-05 PROCEDURE — 6360000002 HC RX W HCPCS: Performed by: INTERNAL MEDICINE

## 2019-10-05 RX ORDER — AMLODIPINE BESYLATE 5 MG/1
5 TABLET ORAL DAILY
Status: DISCONTINUED | OUTPATIENT
Start: 2019-10-06 | End: 2019-10-05 | Stop reason: HOSPADM

## 2019-10-05 RX ORDER — DIPHENHYDRAMINE HYDROCHLORIDE 50 MG/ML
25 INJECTION INTRAMUSCULAR; INTRAVENOUS NIGHTLY PRN
Status: DISCONTINUED | OUTPATIENT
Start: 2019-10-05 | End: 2019-10-05 | Stop reason: HOSPADM

## 2019-10-05 RX ORDER — SENNA PLUS 8.6 MG/1
1 TABLET ORAL 2 TIMES DAILY PRN
Qty: 60 TABLET | Refills: 0 | Status: SHIPPED | OUTPATIENT
Start: 2019-10-05 | End: 2020-10-04

## 2019-10-05 RX ORDER — AMLODIPINE BESYLATE 2.5 MG/1
2.5 TABLET ORAL ONCE
Status: DISCONTINUED | OUTPATIENT
Start: 2019-10-05 | End: 2019-10-05 | Stop reason: HOSPADM

## 2019-10-05 RX ADMIN — PRAVASTATIN SODIUM 20 MG: 20 TABLET ORAL at 08:59

## 2019-10-05 RX ADMIN — AMLODIPINE BESYLATE 2.5 MG: 2.5 TABLET ORAL at 08:59

## 2019-10-05 RX ADMIN — ENOXAPARIN SODIUM 30 MG: 30 INJECTION SUBCUTANEOUS at 09:00

## 2019-10-05 RX ADMIN — ASPIRIN 81 MG 81 MG: 81 TABLET ORAL at 08:59

## 2019-10-05 RX ADMIN — CLOPIDOGREL BISULFATE 75 MG: 75 TABLET ORAL at 08:59

## 2019-10-05 RX ADMIN — DIPHENHYDRAMINE HYDROCHLORIDE 25 MG: 50 INJECTION, SOLUTION INTRAMUSCULAR; INTRAVENOUS at 00:58

## 2019-10-05 RX ADMIN — ONDANSETRON 4 MG: 4 TABLET, ORALLY DISINTEGRATING ORAL at 05:16

## 2019-10-05 RX ADMIN — FAMOTIDINE 20 MG: 20 TABLET ORAL at 08:59

## 2019-10-05 RX ADMIN — SODIUM CHLORIDE: 9 INJECTION, SOLUTION INTRAVENOUS at 14:17

## 2019-10-05 RX ADMIN — LEVOTHYROXINE SODIUM 100 MCG: 100 TABLET ORAL at 06:22

## 2019-10-05 RX ADMIN — TAMSULOSIN HYDROCHLORIDE 0.4 MG: 0.4 CAPSULE ORAL at 08:59

## 2019-10-05 RX ADMIN — SENNA 8.6 MG: 8.6 TABLET, COATED ORAL at 08:59

## 2019-10-05 ASSESSMENT — PAIN SCALES - GENERAL
PAINLEVEL_OUTOF10: 0

## 2019-10-07 ENCOUNTER — TELEPHONE (OUTPATIENT)
Dept: INTERNAL MEDICINE CLINIC | Age: 84
End: 2019-10-07

## 2019-10-14 ENCOUNTER — APPOINTMENT (OUTPATIENT)
Dept: GENERAL RADIOLOGY | Age: 84
End: 2019-10-14
Payer: COMMERCIAL

## 2019-10-14 ENCOUNTER — APPOINTMENT (OUTPATIENT)
Dept: CT IMAGING | Age: 84
End: 2019-10-14
Payer: COMMERCIAL

## 2019-10-14 ENCOUNTER — HOSPITAL ENCOUNTER (EMERGENCY)
Age: 84
Discharge: HOME | End: 2019-10-15
Attending: EMERGENCY MEDICINE
Payer: COMMERCIAL

## 2019-10-14 VITALS
HEIGHT: 72 IN | HEART RATE: 110 BPM | DIASTOLIC BLOOD PRESSURE: 88 MMHG | BODY MASS INDEX: 22.02 KG/M2 | RESPIRATION RATE: 16 BRPM | SYSTOLIC BLOOD PRESSURE: 171 MMHG | TEMPERATURE: 97.5 F | OXYGEN SATURATION: 97 % | WEIGHT: 162.6 LBS

## 2019-10-14 DIAGNOSIS — R53.83 FATIGUE, UNSPECIFIED TYPE: ICD-10-CM

## 2019-10-14 DIAGNOSIS — D64.9 ANEMIA, UNSPECIFIED TYPE: Primary | ICD-10-CM

## 2019-10-14 LAB
-: NORMAL
ABSOLUTE EOS #: 0.14 K/UL (ref 0–0.44)
ABSOLUTE IMMATURE GRANULOCYTE: 0.04 K/UL (ref 0–0.3)
ABSOLUTE LYMPH #: 1.21 K/UL (ref 1.1–3.7)
ABSOLUTE MONO #: 0.75 K/UL (ref 0.1–1.2)
AMORPHOUS: NORMAL
ANION GAP SERPL CALCULATED.3IONS-SCNC: 12 MMOL/L (ref 9–17)
BACTERIA: NORMAL
BASOPHILS # BLD: 1 % (ref 0–2)
BASOPHILS ABSOLUTE: 0.04 K/UL (ref 0–0.2)
BILIRUBIN URINE: NEGATIVE
BUN BLDV-MCNC: 37 MG/DL (ref 8–23)
BUN/CREAT BLD: 19 (ref 9–20)
CALCIUM SERPL-MCNC: 8.7 MG/DL (ref 8.6–10.4)
CASTS UA: NORMAL /LPF
CHLORIDE BLD-SCNC: 109 MMOL/L (ref 98–107)
CO2: 23 MMOL/L (ref 20–31)
COLOR: YELLOW
COMMENT UA: ABNORMAL
CREAT SERPL-MCNC: 2 MG/DL (ref 0.7–1.2)
CRYSTALS, UA: NORMAL /HPF
DATE, STOOL #1: NORMAL
DATE, STOOL #2: NORMAL
DATE, STOOL #3: NORMAL
DIFFERENTIAL TYPE: ABNORMAL
EOSINOPHILS RELATIVE PERCENT: 2 % (ref 1–4)
EPITHELIAL CELLS UA: NORMAL /HPF (ref 0–5)
GFR AFRICAN AMERICAN: 38 ML/MIN
GFR NON-AFRICAN AMERICAN: 32 ML/MIN
GFR SERPL CREATININE-BSD FRML MDRD: ABNORMAL ML/MIN/{1.73_M2}
GFR SERPL CREATININE-BSD FRML MDRD: ABNORMAL ML/MIN/{1.73_M2}
GLUCOSE BLD-MCNC: 138 MG/DL (ref 70–99)
GLUCOSE URINE: NEGATIVE
HCT VFR BLD CALC: 28.2 % (ref 40.7–50.3)
HEMOCCULT SP1 STL QL: NEGATIVE
HEMOCCULT SP2 STL QL: NORMAL
HEMOCCULT SP3 STL QL: NORMAL
HEMOGLOBIN: 8.7 G/DL (ref 13–17)
IMMATURE GRANULOCYTES: 1 %
KETONES, URINE: NEGATIVE
LEUKOCYTE ESTERASE, URINE: NEGATIVE
LYMPHOCYTES # BLD: 16 % (ref 24–43)
MAGNESIUM: 1.8 MG/DL (ref 1.6–2.6)
MCH RBC QN AUTO: 29.9 PG (ref 25.2–33.5)
MCHC RBC AUTO-ENTMCNC: 30.9 G/DL (ref 28.4–34.8)
MCV RBC AUTO: 96.9 FL (ref 82.6–102.9)
MONOCYTES # BLD: 10 % (ref 3–12)
MUCUS: NORMAL
MYOGLOBIN: 122 NG/ML (ref 28–72)
MYOGLOBIN: 134 NG/ML (ref 28–72)
NITRITE, URINE: NEGATIVE
NRBC AUTOMATED: 0 PER 100 WBC
OTHER OBSERVATIONS UA: NORMAL
PDW BLD-RTO: 12.8 % (ref 11.8–14.4)
PH UA: 6 (ref 5–8)
PLATELET # BLD: 154 K/UL (ref 138–453)
PLATELET ESTIMATE: ABNORMAL
PMV BLD AUTO: 10.2 FL (ref 8.1–13.5)
POTASSIUM SERPL-SCNC: 4.2 MMOL/L (ref 3.7–5.3)
PROTEIN UA: ABNORMAL
RBC # BLD: 2.91 M/UL (ref 4.21–5.77)
RBC # BLD: ABNORMAL 10*6/UL
RBC UA: NORMAL /HPF (ref 0–2)
RENAL EPITHELIAL, UA: NORMAL /HPF
SEG NEUTROPHILS: 70 % (ref 36–65)
SEGMENTED NEUTROPHILS ABSOLUTE COUNT: 5.44 K/UL (ref 1.5–8.1)
SODIUM BLD-SCNC: 144 MMOL/L (ref 135–144)
SPECIFIC GRAVITY UA: 1.02 (ref 1–1.03)
TIME, STOOL #1: 1515
TIME, STOOL #2: NORMAL
TIME, STOOL #3: NORMAL
TRICHOMONAS: NORMAL
TROPONIN INTERP: ABNORMAL
TROPONIN INTERP: ABNORMAL
TROPONIN T: ABNORMAL NG/ML
TROPONIN T: ABNORMAL NG/ML
TROPONIN, HIGH SENSITIVITY: 83 NG/L (ref 0–22)
TROPONIN, HIGH SENSITIVITY: 87 NG/L (ref 0–22)
TSH SERPL DL<=0.05 MIU/L-ACNC: 2.11 MIU/L (ref 0.3–5)
TURBIDITY: CLEAR
URINE HGB: NEGATIVE
UROBILINOGEN, URINE: NORMAL
WBC # BLD: 7.6 K/UL (ref 3.5–11.3)
WBC # BLD: ABNORMAL 10*3/UL
WBC UA: NORMAL /HPF (ref 0–5)
YEAST: NORMAL

## 2019-10-14 PROCEDURE — 2580000003 HC RX 258: Performed by: NURSE PRACTITIONER

## 2019-10-14 PROCEDURE — 36415 COLL VENOUS BLD VENIPUNCTURE: CPT

## 2019-10-14 PROCEDURE — 83874 ASSAY OF MYOGLOBIN: CPT

## 2019-10-14 PROCEDURE — 93005 ELECTROCARDIOGRAM TRACING: CPT | Performed by: NURSE PRACTITIONER

## 2019-10-14 PROCEDURE — 83735 ASSAY OF MAGNESIUM: CPT

## 2019-10-14 PROCEDURE — 71250 CT THORAX DX C-: CPT

## 2019-10-14 PROCEDURE — 84443 ASSAY THYROID STIM HORMONE: CPT

## 2019-10-14 PROCEDURE — 80048 BASIC METABOLIC PNL TOTAL CA: CPT

## 2019-10-14 PROCEDURE — 99284 EMERGENCY DEPT VISIT MOD MDM: CPT

## 2019-10-14 PROCEDURE — 71045 X-RAY EXAM CHEST 1 VIEW: CPT

## 2019-10-14 PROCEDURE — 82272 OCCULT BLD FECES 1-3 TESTS: CPT

## 2019-10-14 PROCEDURE — 84484 ASSAY OF TROPONIN QUANT: CPT

## 2019-10-14 PROCEDURE — 81001 URINALYSIS AUTO W/SCOPE: CPT

## 2019-10-14 PROCEDURE — 85025 COMPLETE CBC W/AUTO DIFF WBC: CPT

## 2019-10-14 RX ORDER — SODIUM CHLORIDE 9 MG/ML
INJECTION, SOLUTION INTRAVENOUS CONTINUOUS
Status: DISCONTINUED | OUTPATIENT
Start: 2019-10-14 | End: 2019-10-15 | Stop reason: HOSPADM

## 2019-10-14 RX ADMIN — SODIUM CHLORIDE: 9 INJECTION, SOLUTION INTRAVENOUS at 14:48

## 2019-10-14 ASSESSMENT — ENCOUNTER SYMPTOMS
NAUSEA: 0
BLOOD IN STOOL: 0
SHORTNESS OF BREATH: 0
DIARRHEA: 0
VOMITING: 0
COUGH: 0

## 2019-10-15 ENCOUNTER — OFFICE VISIT (OUTPATIENT)
Dept: INTERNAL MEDICINE CLINIC | Age: 84
End: 2019-10-15
Payer: COMMERCIAL

## 2019-10-15 VITALS
WEIGHT: 162 LBS | BODY MASS INDEX: 21.94 KG/M2 | RESPIRATION RATE: 18 BRPM | DIASTOLIC BLOOD PRESSURE: 70 MMHG | SYSTOLIC BLOOD PRESSURE: 154 MMHG | TEMPERATURE: 98 F | HEIGHT: 72 IN | HEART RATE: 88 BPM

## 2019-10-15 DIAGNOSIS — K59.00 CONSTIPATION, UNSPECIFIED CONSTIPATION TYPE: ICD-10-CM

## 2019-10-15 DIAGNOSIS — I10 ESSENTIAL HYPERTENSION: ICD-10-CM

## 2019-10-15 DIAGNOSIS — D64.9 ANEMIA, UNSPECIFIED TYPE: Primary | ICD-10-CM

## 2019-10-15 LAB
EKG ATRIAL RATE: 88 BPM
EKG P AXIS: 64 DEGREES
EKG P-R INTERVAL: 170 MS
EKG Q-T INTERVAL: 370 MS
EKG QRS DURATION: 94 MS
EKG QTC CALCULATION (BAZETT): 447 MS
EKG R AXIS: 42 DEGREES
EKG T AXIS: 52 DEGREES
EKG VENTRICULAR RATE: 88 BPM

## 2019-10-15 PROCEDURE — 1111F DSCHRG MED/CURRENT MED MERGE: CPT | Performed by: INTERNAL MEDICINE

## 2019-10-15 PROCEDURE — 99214 OFFICE O/P EST MOD 30 MIN: CPT | Performed by: INTERNAL MEDICINE

## 2019-10-15 RX ORDER — CLOPIDOGREL BISULFATE 75 MG/1
75 TABLET ORAL DAILY
Qty: 90 TABLET | Refills: 1 | Status: SHIPPED | OUTPATIENT
Start: 2019-10-15 | End: 2020-05-05

## 2019-10-15 RX ORDER — PANTOPRAZOLE SODIUM 40 MG/1
40 TABLET, DELAYED RELEASE ORAL
Qty: 180 TABLET | Refills: 1 | Status: SHIPPED | OUTPATIENT
Start: 2019-10-15 | End: 2020-04-01

## 2019-10-16 ENCOUNTER — HOSPITAL ENCOUNTER (OUTPATIENT)
Age: 84
Setting detail: OBSERVATION
Discharge: HOME HEALTH | End: 2019-10-18
Attending: EMERGENCY MEDICINE | Admitting: INTERNAL MEDICINE
Payer: COMMERCIAL

## 2019-10-16 DIAGNOSIS — D64.9 ANEMIA, UNSPECIFIED TYPE: Primary | ICD-10-CM

## 2019-10-16 DIAGNOSIS — N18.9 CHRONIC KIDNEY DISEASE, UNSPECIFIED CKD STAGE: ICD-10-CM

## 2019-10-16 LAB
-: ABNORMAL
ABO/RH: NORMAL
ABSOLUTE EOS #: 0.2 K/UL (ref 0–0.44)
ABSOLUTE IMMATURE GRANULOCYTE: 0.04 K/UL (ref 0–0.3)
ABSOLUTE LYMPH #: 1.53 K/UL (ref 1.1–3.7)
ABSOLUTE MONO #: 0.73 K/UL (ref 0.1–1.2)
AMORPHOUS: ABNORMAL
ANION GAP SERPL CALCULATED.3IONS-SCNC: 13 MMOL/L (ref 9–17)
ANTIBODY SCREEN: NEGATIVE
ARM BAND NUMBER: NORMAL
BACTERIA: ABNORMAL
BASOPHILS # BLD: 0 % (ref 0–2)
BASOPHILS ABSOLUTE: 0.03 K/UL (ref 0–0.2)
BILIRUBIN URINE: NEGATIVE
BUN BLDV-MCNC: 38 MG/DL (ref 8–23)
BUN/CREAT BLD: 20 (ref 9–20)
CALCIUM SERPL-MCNC: 8.5 MG/DL (ref 8.6–10.4)
CASTS UA: ABNORMAL /LPF
CASTS UA: ABNORMAL /LPF
CHLORIDE BLD-SCNC: 107 MMOL/L (ref 98–107)
CO2: 22 MMOL/L (ref 20–31)
COLOR: YELLOW
COMMENT UA: ABNORMAL
CREAT SERPL-MCNC: 1.94 MG/DL (ref 0.7–1.2)
CRYSTALS, UA: ABNORMAL /HPF
DIFFERENTIAL TYPE: ABNORMAL
EOSINOPHILS RELATIVE PERCENT: 3 % (ref 1–4)
EPITHELIAL CELLS UA: ABNORMAL /HPF (ref 0–5)
EXPIRATION DATE: NORMAL
GFR AFRICAN AMERICAN: 40 ML/MIN
GFR NON-AFRICAN AMERICAN: 33 ML/MIN
GFR SERPL CREATININE-BSD FRML MDRD: ABNORMAL ML/MIN/{1.73_M2}
GFR SERPL CREATININE-BSD FRML MDRD: ABNORMAL ML/MIN/{1.73_M2}
GLUCOSE BLD-MCNC: 137 MG/DL (ref 70–99)
GLUCOSE URINE: NEGATIVE
HCT VFR BLD CALC: 27.6 % (ref 40.7–50.3)
HEMOGLOBIN: 8.6 G/DL (ref 13–17)
IMMATURE GRANULOCYTES: 1 %
INR BLD: 1
KETONES, URINE: NEGATIVE
LEUKOCYTE ESTERASE, URINE: NEGATIVE
LYMPHOCYTES # BLD: 19 % (ref 24–43)
MCH RBC QN AUTO: 30.5 PG (ref 25.2–33.5)
MCHC RBC AUTO-ENTMCNC: 31.2 G/DL (ref 28–38)
MCV RBC AUTO: 97.9 FL (ref 82.6–102.9)
MONOCYTES # BLD: 9 % (ref 3–12)
MUCUS: ABNORMAL
NITRITE, URINE: NEGATIVE
NRBC AUTOMATED: ABNORMAL PER 100 WBC
OTHER OBSERVATIONS UA: ABNORMAL
PARTIAL THROMBOPLASTIN TIME: 23.4 SEC (ref 23–31)
PDW BLD-RTO: 12.8 % (ref 11.8–14.4)
PH UA: 6 (ref 5–8)
PLATELET # BLD: 163 K/UL (ref 138–453)
PLATELET ESTIMATE: ABNORMAL
PMV BLD AUTO: 10.2 FL (ref 8.1–13.5)
POTASSIUM SERPL-SCNC: 4.2 MMOL/L (ref 3.7–5.3)
PROTEIN UA: ABNORMAL
PROTHROMBIN TIME: 9.9 SEC (ref 9.7–11.6)
RBC # BLD: 2.82 M/UL (ref 4.21–5.77)
RBC # BLD: ABNORMAL 10*6/UL
RBC UA: ABNORMAL /HPF (ref 0–2)
RENAL EPITHELIAL, UA: ABNORMAL /HPF
SEG NEUTROPHILS: 68 % (ref 36–65)
SEGMENTED NEUTROPHILS ABSOLUTE COUNT: 5.49 K/UL (ref 1.5–8.1)
SODIUM BLD-SCNC: 142 MMOL/L (ref 135–144)
SPECIFIC GRAVITY UA: 1.02 (ref 1–1.03)
TRICHOMONAS: ABNORMAL
TROPONIN INTERP: ABNORMAL
TROPONIN INTERP: ABNORMAL
TROPONIN T: ABNORMAL NG/ML
TROPONIN T: ABNORMAL NG/ML
TROPONIN, HIGH SENSITIVITY: 75 NG/L (ref 0–22)
TROPONIN, HIGH SENSITIVITY: 77 NG/L (ref 0–22)
TURBIDITY: CLEAR
URINE HGB: ABNORMAL
UROBILINOGEN, URINE: NORMAL
WBC # BLD: 8 K/UL (ref 3.5–11.3)
WBC # BLD: ABNORMAL 10*3/UL
WBC UA: ABNORMAL /HPF (ref 0–5)
YEAST: ABNORMAL

## 2019-10-16 PROCEDURE — 2580000003 HC RX 258: Performed by: EMERGENCY MEDICINE

## 2019-10-16 PROCEDURE — G0378 HOSPITAL OBSERVATION PER HR: HCPCS

## 2019-10-16 PROCEDURE — 2580000003 HC RX 258: Performed by: INTERNAL MEDICINE

## 2019-10-16 PROCEDURE — 96361 HYDRATE IV INFUSION ADD-ON: CPT

## 2019-10-16 PROCEDURE — 85025 COMPLETE CBC W/AUTO DIFF WBC: CPT

## 2019-10-16 PROCEDURE — 80048 BASIC METABOLIC PNL TOTAL CA: CPT

## 2019-10-16 PROCEDURE — 96360 HYDRATION IV INFUSION INIT: CPT

## 2019-10-16 PROCEDURE — 86850 RBC ANTIBODY SCREEN: CPT

## 2019-10-16 PROCEDURE — 81001 URINALYSIS AUTO W/SCOPE: CPT

## 2019-10-16 PROCEDURE — 84484 ASSAY OF TROPONIN QUANT: CPT

## 2019-10-16 PROCEDURE — 86900 BLOOD TYPING SEROLOGIC ABO: CPT

## 2019-10-16 PROCEDURE — 86901 BLOOD TYPING SEROLOGIC RH(D): CPT

## 2019-10-16 PROCEDURE — 85730 THROMBOPLASTIN TIME PARTIAL: CPT

## 2019-10-16 PROCEDURE — 99285 EMERGENCY DEPT VISIT HI MDM: CPT

## 2019-10-16 PROCEDURE — 85610 PROTHROMBIN TIME: CPT

## 2019-10-16 PROCEDURE — 93005 ELECTROCARDIOGRAM TRACING: CPT | Performed by: EMERGENCY MEDICINE

## 2019-10-16 RX ORDER — CLOPIDOGREL BISULFATE 75 MG/1
75 TABLET ORAL DAILY
Status: DISCONTINUED | OUTPATIENT
Start: 2019-10-17 | End: 2019-10-18 | Stop reason: HOSPADM

## 2019-10-16 RX ORDER — SODIUM CHLORIDE 0.9 % (FLUSH) 0.9 %
10 SYRINGE (ML) INJECTION PRN
Status: DISCONTINUED | OUTPATIENT
Start: 2019-10-16 | End: 2019-10-18 | Stop reason: HOSPADM

## 2019-10-16 RX ORDER — TRAMADOL HYDROCHLORIDE 50 MG/1
50 TABLET ORAL EVERY 8 HOURS PRN
Status: DISCONTINUED | OUTPATIENT
Start: 2019-10-16 | End: 2019-10-18 | Stop reason: HOSPADM

## 2019-10-16 RX ORDER — ACETAMINOPHEN 325 MG/1
650 TABLET ORAL EVERY 4 HOURS PRN
Status: DISCONTINUED | OUTPATIENT
Start: 2019-10-16 | End: 2019-10-18 | Stop reason: HOSPADM

## 2019-10-16 RX ORDER — SODIUM CHLORIDE 0.9 % (FLUSH) 0.9 %
10 SYRINGE (ML) INJECTION PRN
Status: DISCONTINUED | OUTPATIENT
Start: 2019-10-16 | End: 2019-10-16 | Stop reason: SDUPTHER

## 2019-10-16 RX ORDER — PANTOPRAZOLE SODIUM 40 MG/1
40 TABLET, DELAYED RELEASE ORAL
Status: DISCONTINUED | OUTPATIENT
Start: 2019-10-17 | End: 2019-10-18 | Stop reason: HOSPADM

## 2019-10-16 RX ORDER — ONDANSETRON 2 MG/ML
4 INJECTION INTRAMUSCULAR; INTRAVENOUS EVERY 6 HOURS PRN
Status: DISCONTINUED | OUTPATIENT
Start: 2019-10-16 | End: 2019-10-16 | Stop reason: SDUPTHER

## 2019-10-16 RX ORDER — SODIUM CHLORIDE 0.9 % (FLUSH) 0.9 %
10 SYRINGE (ML) INJECTION EVERY 12 HOURS SCHEDULED
Status: DISCONTINUED | OUTPATIENT
Start: 2019-10-16 | End: 2019-10-18 | Stop reason: HOSPADM

## 2019-10-16 RX ORDER — SODIUM CHLORIDE 0.9 % (FLUSH) 0.9 %
10 SYRINGE (ML) INJECTION EVERY 12 HOURS SCHEDULED
Status: DISCONTINUED | OUTPATIENT
Start: 2019-10-16 | End: 2019-10-16 | Stop reason: SDUPTHER

## 2019-10-16 RX ORDER — ONDANSETRON 2 MG/ML
4 INJECTION INTRAMUSCULAR; INTRAVENOUS EVERY 6 HOURS PRN
Status: DISCONTINUED | OUTPATIENT
Start: 2019-10-16 | End: 2019-10-18 | Stop reason: HOSPADM

## 2019-10-16 RX ORDER — AMLODIPINE BESYLATE 2.5 MG/1
2.5 TABLET ORAL DAILY
Status: DISCONTINUED | OUTPATIENT
Start: 2019-10-17 | End: 2019-10-18 | Stop reason: HOSPADM

## 2019-10-16 RX ORDER — LISINOPRIL 10 MG/1
20 TABLET ORAL DAILY
Status: DISCONTINUED | OUTPATIENT
Start: 2019-10-17 | End: 2019-10-18 | Stop reason: HOSPADM

## 2019-10-16 RX ORDER — TIZANIDINE 2 MG/1
2 TABLET ORAL EVERY 6 HOURS PRN
Status: DISCONTINUED | OUTPATIENT
Start: 2019-10-16 | End: 2019-10-18 | Stop reason: HOSPADM

## 2019-10-16 RX ORDER — 0.9 % SODIUM CHLORIDE 0.9 %
500 INTRAVENOUS SOLUTION INTRAVENOUS ONCE
Status: COMPLETED | OUTPATIENT
Start: 2019-10-16 | End: 2019-10-16

## 2019-10-16 RX ORDER — ACETAMINOPHEN 325 MG/1
650 TABLET ORAL EVERY 4 HOURS PRN
Status: DISCONTINUED | OUTPATIENT
Start: 2019-10-16 | End: 2019-10-16 | Stop reason: SDUPTHER

## 2019-10-16 RX ORDER — TAMSULOSIN HYDROCHLORIDE 0.4 MG/1
0.4 CAPSULE ORAL DAILY
Status: DISCONTINUED | OUTPATIENT
Start: 2019-10-17 | End: 2019-10-18 | Stop reason: HOSPADM

## 2019-10-16 RX ORDER — LEVOTHYROXINE SODIUM 0.1 MG/1
100 TABLET ORAL DAILY
Status: DISCONTINUED | OUTPATIENT
Start: 2019-10-17 | End: 2019-10-18 | Stop reason: HOSPADM

## 2019-10-16 RX ORDER — PRAVASTATIN SODIUM 20 MG
20 TABLET ORAL DAILY
Status: DISCONTINUED | OUTPATIENT
Start: 2019-10-17 | End: 2019-10-18 | Stop reason: HOSPADM

## 2019-10-16 RX ORDER — ASPIRIN 81 MG/1
81 TABLET ORAL DAILY
Status: DISCONTINUED | OUTPATIENT
Start: 2019-10-17 | End: 2019-10-18 | Stop reason: HOSPADM

## 2019-10-16 RX ORDER — ONDANSETRON 4 MG/1
4 TABLET, ORALLY DISINTEGRATING ORAL EVERY 6 HOURS PRN
Status: DISCONTINUED | OUTPATIENT
Start: 2019-10-16 | End: 2019-10-18 | Stop reason: HOSPADM

## 2019-10-16 RX ORDER — SENNA PLUS 8.6 MG/1
1 TABLET ORAL 2 TIMES DAILY
Status: DISCONTINUED | OUTPATIENT
Start: 2019-10-16 | End: 2019-10-18 | Stop reason: HOSPADM

## 2019-10-16 RX ORDER — SODIUM CHLORIDE 9 MG/ML
INJECTION, SOLUTION INTRAVENOUS CONTINUOUS
Status: DISCONTINUED | OUTPATIENT
Start: 2019-10-16 | End: 2019-10-17

## 2019-10-16 RX ADMIN — SODIUM CHLORIDE: 9 INJECTION, SOLUTION INTRAVENOUS at 23:42

## 2019-10-16 RX ADMIN — SODIUM CHLORIDE 500 ML: 0.9 INJECTION, SOLUTION INTRAVENOUS at 20:53

## 2019-10-16 ASSESSMENT — PAIN DESCRIPTION - ONSET: ONSET: ON-GOING

## 2019-10-16 ASSESSMENT — PAIN DESCRIPTION - FREQUENCY: FREQUENCY: CONTINUOUS

## 2019-10-16 ASSESSMENT — PAIN DESCRIPTION - ORIENTATION: ORIENTATION: MID

## 2019-10-16 ASSESSMENT — PAIN DESCRIPTION - LOCATION: LOCATION: BACK

## 2019-10-16 ASSESSMENT — PAIN - FUNCTIONAL ASSESSMENT: PAIN_FUNCTIONAL_ASSESSMENT: PREVENTS OR INTERFERES SOME ACTIVE ACTIVITIES AND ADLS

## 2019-10-16 ASSESSMENT — PAIN DESCRIPTION - PROGRESSION: CLINICAL_PROGRESSION: NOT CHANGED

## 2019-10-16 ASSESSMENT — PAIN DESCRIPTION - PAIN TYPE: TYPE: CHRONIC PAIN

## 2019-10-16 ASSESSMENT — PAIN DESCRIPTION - DESCRIPTORS: DESCRIPTORS: ACHING

## 2019-10-16 ASSESSMENT — PAIN SCALES - GENERAL: PAINLEVEL_OUTOF10: 3

## 2019-10-17 ENCOUNTER — TELEPHONE (OUTPATIENT)
Dept: INTERNAL MEDICINE CLINIC | Age: 84
End: 2019-10-17

## 2019-10-17 LAB
ABSOLUTE EOS #: 0.2 K/UL (ref 0–0.44)
ABSOLUTE IMMATURE GRANULOCYTE: 0.02 K/UL (ref 0–0.3)
ABSOLUTE LYMPH #: 1.44 K/UL (ref 1.1–3.7)
ABSOLUTE MONO #: 0.65 K/UL (ref 0.1–1.2)
ABSOLUTE RETIC #: 0.03 M/UL (ref 0.03–0.08)
ALBUMIN SERPL-MCNC: 3.1 G/DL (ref 3.5–5.2)
ALBUMIN/GLOBULIN RATIO: ABNORMAL (ref 1–2.5)
ALP BLD-CCNC: 51 U/L (ref 40–129)
ALT SERPL-CCNC: 8 U/L (ref 5–41)
ANION GAP SERPL CALCULATED.3IONS-SCNC: 11 MMOL/L (ref 9–17)
AST SERPL-CCNC: 12 U/L
BASOPHILS # BLD: 1 % (ref 0–2)
BASOPHILS ABSOLUTE: 0.03 K/UL (ref 0–0.2)
BILIRUB SERPL-MCNC: 0.36 MG/DL (ref 0.3–1.2)
BILIRUBIN DIRECT: 0.09 MG/DL
BILIRUBIN, INDIRECT: 0.27 MG/DL (ref 0–1)
BUN BLDV-MCNC: 33 MG/DL (ref 8–23)
CALCIUM SERPL-MCNC: 8.4 MG/DL (ref 8.6–10.4)
CHLORIDE BLD-SCNC: 109 MMOL/L (ref 98–107)
CO2: 22 MMOL/L (ref 20–31)
CREAT SERPL-MCNC: 1.84 MG/DL (ref 0.7–1.2)
DIFFERENTIAL TYPE: ABNORMAL
EKG ATRIAL RATE: 97 BPM
EKG P AXIS: 46 DEGREES
EKG P-R INTERVAL: 164 MS
EKG Q-T INTERVAL: 358 MS
EKG QRS DURATION: 92 MS
EKG QTC CALCULATION (BAZETT): 454 MS
EKG R AXIS: 41 DEGREES
EKG T AXIS: 0 DEGREES
EKG VENTRICULAR RATE: 97 BPM
EOSINOPHILS RELATIVE PERCENT: 3 % (ref 1–4)
GFR AFRICAN AMERICAN: 42 ML/MIN
GFR NON-AFRICAN AMERICAN: 35 ML/MIN
GFR SERPL CREATININE-BSD FRML MDRD: ABNORMAL ML/MIN/{1.73_M2}
GFR SERPL CREATININE-BSD FRML MDRD: ABNORMAL ML/MIN/{1.73_M2}
GLUCOSE BLD-MCNC: 99 MG/DL (ref 70–99)
HAPTOGLOBIN: 13 MG/DL (ref 30–200)
HCT VFR BLD CALC: 25.4 % (ref 40.7–50.3)
HEMOGLOBIN: 7.8 G/DL (ref 13–17)
IMMATURE GRANULOCYTES: 0 %
IMMATURE RETIC FRACT: 5.4 % (ref 2.7–18.3)
LACTATE DEHYDROGENASE: 342 U/L (ref 135–225)
LYMPHOCYTES # BLD: 23 % (ref 24–43)
MCH RBC QN AUTO: 30.5 PG (ref 25.2–33.5)
MCHC RBC AUTO-ENTMCNC: 30.7 G/DL (ref 28–38)
MCV RBC AUTO: 99.2 FL (ref 82.6–102.9)
MONOCYTES # BLD: 11 % (ref 3–12)
NRBC AUTOMATED: ABNORMAL PER 100 WBC
PDW BLD-RTO: 12.7 % (ref 11.8–14.4)
PLATELET # BLD: 139 K/UL (ref 138–453)
PLATELET ESTIMATE: ABNORMAL
PMV BLD AUTO: 10 FL (ref 8.1–13.5)
POTASSIUM SERPL-SCNC: 4.5 MMOL/L (ref 3.7–5.3)
RBC # BLD: 2.56 M/UL (ref 4.21–5.77)
RBC # BLD: ABNORMAL 10*6/UL
RETIC %: 1.2 % (ref 0.5–1.9)
RETIC HEMOGLOBIN: 30.9 PG (ref 28.2–35.7)
SEG NEUTROPHILS: 62 % (ref 36–65)
SEGMENTED NEUTROPHILS ABSOLUTE COUNT: 3.81 K/UL (ref 1.5–8.1)
SODIUM BLD-SCNC: 142 MMOL/L (ref 135–144)
TOTAL PROTEIN: 4.8 G/DL (ref 6.4–8.3)
WBC # BLD: 6.2 K/UL (ref 3.5–11.3)
WBC # BLD: ABNORMAL 10*3/UL

## 2019-10-17 PROCEDURE — 36415 COLL VENOUS BLD VENIPUNCTURE: CPT

## 2019-10-17 PROCEDURE — 6360000002 HC RX W HCPCS: Performed by: INTERNAL MEDICINE

## 2019-10-17 PROCEDURE — 2580000003 HC RX 258: Performed by: INTERNAL MEDICINE

## 2019-10-17 PROCEDURE — 97535 SELF CARE MNGMENT TRAINING: CPT

## 2019-10-17 PROCEDURE — 83010 ASSAY OF HAPTOGLOBIN QUANT: CPT

## 2019-10-17 PROCEDURE — 6370000000 HC RX 637 (ALT 250 FOR IP): Performed by: INTERNAL MEDICINE

## 2019-10-17 PROCEDURE — 85045 AUTOMATED RETICULOCYTE COUNT: CPT

## 2019-10-17 PROCEDURE — 97162 PT EVAL MOD COMPLEX 30 MIN: CPT

## 2019-10-17 PROCEDURE — 80053 COMPREHEN METABOLIC PANEL: CPT

## 2019-10-17 PROCEDURE — 96365 THER/PROPH/DIAG IV INF INIT: CPT

## 2019-10-17 PROCEDURE — 99223 1ST HOSP IP/OBS HIGH 75: CPT | Performed by: INTERNAL MEDICINE

## 2019-10-17 PROCEDURE — 99222 1ST HOSP IP/OBS MODERATE 55: CPT | Performed by: INTERNAL MEDICINE

## 2019-10-17 PROCEDURE — 97116 GAIT TRAINING THERAPY: CPT

## 2019-10-17 PROCEDURE — 83615 LACTATE (LD) (LDH) ENZYME: CPT

## 2019-10-17 PROCEDURE — 82248 BILIRUBIN DIRECT: CPT

## 2019-10-17 PROCEDURE — G0378 HOSPITAL OBSERVATION PER HR: HCPCS

## 2019-10-17 PROCEDURE — 97530 THERAPEUTIC ACTIVITIES: CPT

## 2019-10-17 PROCEDURE — 97166 OT EVAL MOD COMPLEX 45 MIN: CPT

## 2019-10-17 PROCEDURE — 85027 COMPLETE CBC AUTOMATED: CPT

## 2019-10-17 PROCEDURE — 96361 HYDRATE IV INFUSION ADD-ON: CPT

## 2019-10-17 PROCEDURE — 96372 THER/PROPH/DIAG INJ SC/IM: CPT

## 2019-10-17 RX ORDER — CLONIDINE HYDROCHLORIDE 0.2 MG/1
0.2 TABLET ORAL ONCE
Status: COMPLETED | OUTPATIENT
Start: 2019-10-17 | End: 2019-10-17

## 2019-10-17 RX ORDER — ALPRAZOLAM 0.25 MG/1
0.25 TABLET ORAL EVERY 6 HOURS PRN
Status: DISCONTINUED | OUTPATIENT
Start: 2019-10-17 | End: 2019-10-18 | Stop reason: HOSPADM

## 2019-10-17 RX ADMIN — CLONIDINE HYDROCHLORIDE 0.2 MG: 0.2 TABLET ORAL at 00:23

## 2019-10-17 RX ADMIN — CLOPIDOGREL BISULFATE 75 MG: 75 TABLET ORAL at 16:47

## 2019-10-17 RX ADMIN — ENOXAPARIN SODIUM 30 MG: 30 INJECTION SUBCUTANEOUS at 23:24

## 2019-10-17 RX ADMIN — TAMSULOSIN HYDROCHLORIDE 0.4 MG: 0.4 CAPSULE ORAL at 16:47

## 2019-10-17 RX ADMIN — ALPRAZOLAM 0.25 MG: 0.25 TABLET ORAL at 20:40

## 2019-10-17 RX ADMIN — Medication 10 ML: at 20:41

## 2019-10-17 RX ADMIN — IRON SUCROSE 200 MG: 20 INJECTION, SOLUTION INTRAVENOUS at 12:41

## 2019-10-17 RX ADMIN — AMLODIPINE BESYLATE 2.5 MG: 2.5 TABLET ORAL at 16:47

## 2019-10-17 RX ADMIN — Medication 20 MG: at 16:47

## 2019-10-17 RX ADMIN — ACETAMINOPHEN 650 MG: 325 TABLET ORAL at 08:00

## 2019-10-17 RX ADMIN — ONDANSETRON 4 MG: 2 INJECTION INTRAMUSCULAR; INTRAVENOUS at 04:02

## 2019-10-17 RX ADMIN — ASPIRIN 81 MG: 81 TABLET ORAL at 16:46

## 2019-10-17 ASSESSMENT — PAIN DESCRIPTION - PROGRESSION

## 2019-10-17 ASSESSMENT — PAIN SCALES - GENERAL
PAINLEVEL_OUTOF10: 0
PAINLEVEL_OUTOF10: 0
PAINLEVEL_OUTOF10: 5
PAINLEVEL_OUTOF10: 6

## 2019-10-17 ASSESSMENT — PAIN DESCRIPTION - ONSET: ONSET: ON-GOING

## 2019-10-17 ASSESSMENT — PAIN DESCRIPTION - DESCRIPTORS: DESCRIPTORS: ACHING

## 2019-10-17 ASSESSMENT — PAIN DESCRIPTION - PAIN TYPE
TYPE: CHRONIC PAIN
TYPE: ACUTE PAIN
TYPE: ACUTE PAIN

## 2019-10-17 ASSESSMENT — PAIN DESCRIPTION - LOCATION
LOCATION: HIP;BACK
LOCATION: BUTTOCKS
LOCATION: BUTTOCKS

## 2019-10-17 ASSESSMENT — PAIN DESCRIPTION - ORIENTATION: ORIENTATION: RIGHT;LOWER

## 2019-10-17 ASSESSMENT — PAIN - FUNCTIONAL ASSESSMENT: PAIN_FUNCTIONAL_ASSESSMENT: PREVENTS OR INTERFERES SOME ACTIVE ACTIVITIES AND ADLS

## 2019-10-17 ASSESSMENT — PAIN DESCRIPTION - FREQUENCY: FREQUENCY: CONTINUOUS

## 2019-10-18 VITALS
HEART RATE: 82 BPM | RESPIRATION RATE: 16 BRPM | HEIGHT: 72 IN | BODY MASS INDEX: 22.11 KG/M2 | DIASTOLIC BLOOD PRESSURE: 64 MMHG | OXYGEN SATURATION: 93 % | WEIGHT: 163.2 LBS | SYSTOLIC BLOOD PRESSURE: 159 MMHG | TEMPERATURE: 98.1 F

## 2019-10-18 PROBLEM — E44.0 MODERATE MALNUTRITION (HCC): Chronic | Status: ACTIVE | Noted: 2019-10-18

## 2019-10-18 LAB
ANION GAP SERPL CALCULATED.3IONS-SCNC: 13 MMOL/L (ref 9–17)
BUN BLDV-MCNC: 33 MG/DL (ref 8–23)
BUN/CREAT BLD: 18 (ref 9–20)
CALCIUM SERPL-MCNC: 8.5 MG/DL (ref 8.6–10.4)
CHLORIDE BLD-SCNC: 106 MMOL/L (ref 98–107)
CO2: 22 MMOL/L (ref 20–31)
CREAT SERPL-MCNC: 1.84 MG/DL (ref 0.7–1.2)
GFR AFRICAN AMERICAN: 42 ML/MIN
GFR NON-AFRICAN AMERICAN: 35 ML/MIN
GFR SERPL CREATININE-BSD FRML MDRD: ABNORMAL ML/MIN/{1.73_M2}
GFR SERPL CREATININE-BSD FRML MDRD: ABNORMAL ML/MIN/{1.73_M2}
GLUCOSE BLD-MCNC: 130 MG/DL (ref 70–99)
PATHOLOGIST REVIEW: NORMAL
POTASSIUM SERPL-SCNC: 4 MMOL/L (ref 3.7–5.3)
SODIUM BLD-SCNC: 141 MMOL/L (ref 135–144)
SURGICAL PATHOLOGY REPORT: NORMAL

## 2019-10-18 PROCEDURE — 2580000003 HC RX 258: Performed by: INTERNAL MEDICINE

## 2019-10-18 PROCEDURE — 6360000002 HC RX W HCPCS: Performed by: INTERNAL MEDICINE

## 2019-10-18 PROCEDURE — 96366 THER/PROPH/DIAG IV INF ADDON: CPT

## 2019-10-18 PROCEDURE — 6370000000 HC RX 637 (ALT 250 FOR IP): Performed by: INTERNAL MEDICINE

## 2019-10-18 PROCEDURE — 36415 COLL VENOUS BLD VENIPUNCTURE: CPT

## 2019-10-18 PROCEDURE — 80048 BASIC METABOLIC PNL TOTAL CA: CPT

## 2019-10-18 PROCEDURE — 99238 HOSP IP/OBS DSCHRG MGMT 30/<: CPT | Performed by: INTERNAL MEDICINE

## 2019-10-18 PROCEDURE — G0378 HOSPITAL OBSERVATION PER HR: HCPCS

## 2019-10-18 PROCEDURE — 96372 THER/PROPH/DIAG INJ SC/IM: CPT

## 2019-10-18 RX ADMIN — SENNA 8.6 MG: 8.6 TABLET, COATED ORAL at 09:16

## 2019-10-18 RX ADMIN — ACETAMINOPHEN 650 MG: 325 TABLET ORAL at 06:43

## 2019-10-18 RX ADMIN — TAMSULOSIN HYDROCHLORIDE 0.4 MG: 0.4 CAPSULE ORAL at 09:20

## 2019-10-18 RX ADMIN — LISINOPRIL 20 MG: 10 TABLET ORAL at 09:18

## 2019-10-18 RX ADMIN — AMLODIPINE BESYLATE 2.5 MG: 2.5 TABLET ORAL at 09:19

## 2019-10-18 RX ADMIN — LEVOTHYROXINE SODIUM 100 MCG: 0.1 TABLET ORAL at 06:44

## 2019-10-18 RX ADMIN — Medication 20 MG: at 09:18

## 2019-10-18 RX ADMIN — PANTOPRAZOLE SODIUM 40 MG: 40 TABLET, DELAYED RELEASE ORAL at 06:44

## 2019-10-18 RX ADMIN — CLOPIDOGREL BISULFATE 75 MG: 75 TABLET ORAL at 09:19

## 2019-10-18 RX ADMIN — IRON SUCROSE 200 MG: 20 INJECTION, SOLUTION INTRAVENOUS at 09:16

## 2019-10-18 RX ADMIN — MAGNESIUM HYDROXIDE 30 ML: 400 SUSPENSION ORAL at 02:20

## 2019-10-18 RX ADMIN — ASPIRIN 81 MG: 81 TABLET ORAL at 09:19

## 2019-10-18 RX ADMIN — Medication 10 ML: at 09:21

## 2019-10-18 RX ADMIN — ENOXAPARIN SODIUM 30 MG: 30 INJECTION SUBCUTANEOUS at 09:16

## 2019-10-18 RX ADMIN — ONDANSETRON 4 MG: 2 INJECTION INTRAMUSCULAR; INTRAVENOUS at 04:35

## 2019-10-18 ASSESSMENT — PAIN DESCRIPTION - PROGRESSION

## 2019-10-18 ASSESSMENT — PAIN SCALES - GENERAL: PAINLEVEL_OUTOF10: 5

## 2019-10-21 ENCOUNTER — TELEPHONE (OUTPATIENT)
Dept: ONCOLOGY | Age: 84
End: 2019-10-21

## 2019-10-21 ENCOUNTER — TELEPHONE (OUTPATIENT)
Dept: INTERNAL MEDICINE CLINIC | Age: 84
End: 2019-10-21

## 2019-10-23 ENCOUNTER — TELEPHONE (OUTPATIENT)
Dept: ONCOLOGY | Age: 84
End: 2019-10-23

## 2019-10-23 ENCOUNTER — OFFICE VISIT (OUTPATIENT)
Dept: INTERNAL MEDICINE CLINIC | Age: 84
End: 2019-10-23
Payer: COMMERCIAL

## 2019-10-23 VITALS
HEART RATE: 97 BPM | DIASTOLIC BLOOD PRESSURE: 60 MMHG | OXYGEN SATURATION: 94 % | HEIGHT: 72 IN | TEMPERATURE: 97.4 F | SYSTOLIC BLOOD PRESSURE: 140 MMHG | BODY MASS INDEX: 22.21 KG/M2 | WEIGHT: 164 LBS

## 2019-10-23 DIAGNOSIS — D64.9 ANEMIA, UNSPECIFIED TYPE: Primary | ICD-10-CM

## 2019-10-23 DIAGNOSIS — K59.00 CONSTIPATION, UNSPECIFIED CONSTIPATION TYPE: ICD-10-CM

## 2019-10-23 PROCEDURE — 1111F DSCHRG MED/CURRENT MED MERGE: CPT | Performed by: INTERNAL MEDICINE

## 2019-10-23 PROCEDURE — 99215 OFFICE O/P EST HI 40 MIN: CPT | Performed by: INTERNAL MEDICINE

## 2019-10-23 RX ORDER — LANOLIN ALCOHOL/MO/W.PET/CERES
325 CREAM (GRAM) TOPICAL
COMMUNITY
Start: 2019-09-19 | End: 2019-12-31 | Stop reason: ALTCHOICE

## 2019-10-23 RX ORDER — ALPRAZOLAM 0.25 MG/1
0.25 TABLET ORAL
COMMUNITY
Start: 2019-10-17 | End: 2019-12-31

## 2019-10-23 RX ORDER — BICALUTAMIDE 50 MG/1
50 TABLET, FILM COATED ORAL
COMMUNITY
End: 2019-12-31 | Stop reason: ALTCHOICE

## 2019-10-26 ENCOUNTER — HOSPITAL ENCOUNTER (EMERGENCY)
Age: 84
Discharge: HOME | End: 2019-10-26
Attending: EMERGENCY MEDICINE
Payer: COMMERCIAL

## 2019-10-26 VITALS
BODY MASS INDEX: 21.94 KG/M2 | DIASTOLIC BLOOD PRESSURE: 64 MMHG | OXYGEN SATURATION: 99 % | TEMPERATURE: 98.1 F | HEART RATE: 100 BPM | HEIGHT: 72 IN | RESPIRATION RATE: 16 BRPM | WEIGHT: 162 LBS | SYSTOLIC BLOOD PRESSURE: 190 MMHG

## 2019-10-26 DIAGNOSIS — R42 DIZZINESS: Primary | ICD-10-CM

## 2019-10-26 LAB
-: ABNORMAL
ABO/RH: NORMAL
ABSOLUTE EOS #: 0.23 K/UL (ref 0–0.44)
ABSOLUTE IMMATURE GRANULOCYTE: 0.04 K/UL (ref 0–0.3)
ABSOLUTE LYMPH #: 1.5 K/UL (ref 1.1–3.7)
ABSOLUTE MONO #: 0.58 K/UL (ref 0.1–1.2)
AMORPHOUS: ABNORMAL
ANION GAP SERPL CALCULATED.3IONS-SCNC: 13 MMOL/L (ref 9–17)
ANTIBODY SCREEN: NEGATIVE
ARM BAND NUMBER: NORMAL
BACTERIA: ABNORMAL
BASOPHILS # BLD: 1 % (ref 0–2)
BASOPHILS ABSOLUTE: 0.03 K/UL (ref 0–0.2)
BILIRUBIN URINE: NEGATIVE
BUN BLDV-MCNC: 36 MG/DL (ref 8–23)
BUN/CREAT BLD: 18 (ref 9–20)
CALCIUM SERPL-MCNC: 9.2 MG/DL (ref 8.6–10.4)
CASTS UA: ABNORMAL /LPF
CHLORIDE BLD-SCNC: 107 MMOL/L (ref 98–107)
CO2: 24 MMOL/L (ref 20–31)
COLOR: YELLOW
COMMENT UA: ABNORMAL
CREAT SERPL-MCNC: 1.95 MG/DL (ref 0.7–1.2)
CRYSTALS, UA: ABNORMAL /HPF
DIFFERENTIAL TYPE: ABNORMAL
EKG ATRIAL RATE: 96 BPM
EKG P AXIS: 37 DEGREES
EKG P-R INTERVAL: 162 MS
EKG Q-T INTERVAL: 368 MS
EKG QRS DURATION: 94 MS
EKG QTC CALCULATION (BAZETT): 464 MS
EKG R AXIS: 41 DEGREES
EKG T AXIS: 63 DEGREES
EKG VENTRICULAR RATE: 96 BPM
EOSINOPHILS RELATIVE PERCENT: 4 % (ref 1–4)
EPITHELIAL CELLS UA: ABNORMAL /HPF (ref 0–5)
EXPIRATION DATE: NORMAL
GFR AFRICAN AMERICAN: 39 ML/MIN
GFR NON-AFRICAN AMERICAN: 32 ML/MIN
GFR SERPL CREATININE-BSD FRML MDRD: ABNORMAL ML/MIN/{1.73_M2}
GFR SERPL CREATININE-BSD FRML MDRD: ABNORMAL ML/MIN/{1.73_M2}
GLUCOSE BLD-MCNC: 137 MG/DL (ref 70–99)
GLUCOSE URINE: NEGATIVE
HCT VFR BLD CALC: 29.6 % (ref 40.7–50.3)
HEMOGLOBIN: 9.2 G/DL (ref 13–17)
IMMATURE GRANULOCYTES: 1 %
KETONES, URINE: NEGATIVE
LEUKOCYTE ESTERASE, URINE: NEGATIVE
LYMPHOCYTES # BLD: 24 % (ref 24–43)
MCH RBC QN AUTO: 30.1 PG (ref 25.2–33.5)
MCHC RBC AUTO-ENTMCNC: 31.1 G/DL (ref 28.4–34.8)
MCV RBC AUTO: 96.7 FL (ref 82.6–102.9)
MONOCYTES # BLD: 9 % (ref 3–12)
MUCUS: ABNORMAL
NITRITE, URINE: NEGATIVE
NRBC AUTOMATED: 0 PER 100 WBC
OTHER OBSERVATIONS UA: ABNORMAL
PDW BLD-RTO: 12.9 % (ref 11.8–14.4)
PH UA: 7 (ref 5–8)
PLATELET # BLD: 160 K/UL (ref 138–453)
PLATELET ESTIMATE: ABNORMAL
PMV BLD AUTO: 9.8 FL (ref 8.1–13.5)
POTASSIUM SERPL-SCNC: 4.4 MMOL/L (ref 3.7–5.3)
PROTEIN UA: ABNORMAL
RBC # BLD: 3.06 M/UL (ref 4.21–5.77)
RBC # BLD: ABNORMAL 10*6/UL
RBC UA: ABNORMAL /HPF (ref 0–2)
RENAL EPITHELIAL, UA: ABNORMAL /HPF
SEG NEUTROPHILS: 61 % (ref 36–65)
SEGMENTED NEUTROPHILS ABSOLUTE COUNT: 3.89 K/UL (ref 1.5–8.1)
SODIUM BLD-SCNC: 144 MMOL/L (ref 135–144)
SPECIFIC GRAVITY UA: 1.01 (ref 1–1.03)
TRICHOMONAS: ABNORMAL
TURBIDITY: CLEAR
URINE HGB: ABNORMAL
UROBILINOGEN, URINE: NORMAL
WBC # BLD: 6.3 K/UL (ref 3.5–11.3)
WBC # BLD: ABNORMAL 10*3/UL
WBC UA: ABNORMAL /HPF (ref 0–5)
YEAST: ABNORMAL

## 2019-10-26 PROCEDURE — 86850 RBC ANTIBODY SCREEN: CPT

## 2019-10-26 PROCEDURE — 81001 URINALYSIS AUTO W/SCOPE: CPT

## 2019-10-26 PROCEDURE — 85025 COMPLETE CBC W/AUTO DIFF WBC: CPT

## 2019-10-26 PROCEDURE — 86900 BLOOD TYPING SEROLOGIC ABO: CPT

## 2019-10-26 PROCEDURE — 99284 EMERGENCY DEPT VISIT MOD MDM: CPT

## 2019-10-26 PROCEDURE — 86901 BLOOD TYPING SEROLOGIC RH(D): CPT

## 2019-10-26 PROCEDURE — 80048 BASIC METABOLIC PNL TOTAL CA: CPT

## 2019-10-26 PROCEDURE — 93005 ELECTROCARDIOGRAM TRACING: CPT

## 2019-10-26 ASSESSMENT — PAIN SCALES - GENERAL: PAINLEVEL_OUTOF10: 9

## 2019-10-28 LAB
ALBUMIN SERPL-MCNC: NORMAL G/DL
ALP BLD-CCNC: NORMAL U/L
ALT SERPL-CCNC: NORMAL U/L
ANION GAP SERPL CALCULATED.3IONS-SCNC: NORMAL MMOL/L
AST SERPL-CCNC: NORMAL U/L
BASOPHILS ABSOLUTE: NORMAL /ΜL
BASOPHILS RELATIVE PERCENT: NORMAL %
BILIRUB SERPL-MCNC: NORMAL MG/DL (ref 0.1–1.4)
BILIRUBIN, URINE: NORMAL
BLOOD, URINE: NORMAL
BUN BLDV-MCNC: NORMAL MG/DL
CALCIUM SERPL-MCNC: NORMAL MG/DL
CHLORIDE BLD-SCNC: NORMAL MMOL/L
CLARITY: NORMAL
CO2: NORMAL MMOL/L
COLOR: NORMAL
CREAT SERPL-MCNC: NORMAL MG/DL
EOSINOPHILS ABSOLUTE: NORMAL /ΜL
EOSINOPHILS RELATIVE PERCENT: NORMAL %
FERRITIN: NORMAL NG/ML (ref 18–300)
GFR CALCULATED: NORMAL
GLUCOSE BLD-MCNC: NORMAL MG/DL
GLUCOSE URINE: NORMAL
HCT VFR BLD CALC: NORMAL % (ref 41–53)
HEMOGLOBIN: NORMAL G/DL (ref 13.5–17.5)
IRON: NORMAL
KETONES, URINE: NORMAL
LEUKOCYTE ESTERASE, URINE: NORMAL
LYMPHOCYTES ABSOLUTE: NORMAL /ΜL
LYMPHOCYTES RELATIVE PERCENT: NORMAL %
MCH RBC QN AUTO: NORMAL PG
MCHC RBC AUTO-ENTMCNC: NORMAL G/DL
MCV RBC AUTO: NORMAL FL
MONOCYTES ABSOLUTE: NORMAL /ΜL
MONOCYTES RELATIVE PERCENT: NORMAL %
NEUTROPHILS ABSOLUTE: NORMAL /ΜL
NEUTROPHILS RELATIVE PERCENT: NORMAL %
NITRITE, URINE: NORMAL
PH UA: NORMAL (ref 4.5–8)
PLATELET # BLD: NORMAL K/ΜL
PMV BLD AUTO: NORMAL FL
POTASSIUM SERPL-SCNC: NORMAL MMOL/L
PROTEIN UA: NORMAL
RBC # BLD: NORMAL 10^6/ΜL
SODIUM BLD-SCNC: NORMAL MMOL/L
SPECIFIC GRAVITY, URINE: NORMAL
TOTAL IRON BINDING CAPACITY: NORMAL
TOTAL PROTEIN: NORMAL
URIC ACID: NORMAL
UROBILINOGEN, URINE: NORMAL
VITAMIN B-12: NORMAL
WBC # BLD: NORMAL 10^3/ML

## 2019-10-30 ENCOUNTER — TELEPHONE (OUTPATIENT)
Dept: INTERNAL MEDICINE CLINIC | Age: 84
End: 2019-10-30

## 2019-10-30 ENCOUNTER — HOSPITAL ENCOUNTER (EMERGENCY)
Age: 84
Discharge: HOME | End: 2019-10-30
Attending: EMERGENCY MEDICINE
Payer: COMMERCIAL

## 2019-10-30 VITALS
SYSTOLIC BLOOD PRESSURE: 172 MMHG | TEMPERATURE: 97.9 F | DIASTOLIC BLOOD PRESSURE: 73 MMHG | HEART RATE: 98 BPM | OXYGEN SATURATION: 98 % | WEIGHT: 162 LBS | BODY MASS INDEX: 21.94 KG/M2 | RESPIRATION RATE: 20 BRPM | HEIGHT: 72 IN

## 2019-10-30 DIAGNOSIS — R53.1 GENERAL WEAKNESS: Primary | ICD-10-CM

## 2019-10-30 DIAGNOSIS — D64.9 CHRONIC ANEMIA: ICD-10-CM

## 2019-10-30 LAB
-: ABNORMAL
ABO/RH: NORMAL
ABSOLUTE EOS #: 0.14 K/UL (ref 0–0.44)
ABSOLUTE IMMATURE GRANULOCYTE: 0.03 K/UL (ref 0–0.3)
ABSOLUTE LYMPH #: 1.33 K/UL (ref 1.1–3.7)
ABSOLUTE MONO #: 0.64 K/UL (ref 0.1–1.2)
AMORPHOUS: ABNORMAL
ANION GAP SERPL CALCULATED.3IONS-SCNC: 10 MMOL/L (ref 9–17)
ANTIBODY SCREEN: NEGATIVE
ARM BAND NUMBER: NORMAL
BACTERIA: ABNORMAL
BASOPHILS # BLD: 0 % (ref 0–2)
BASOPHILS ABSOLUTE: 0.03 K/UL (ref 0–0.2)
BILIRUBIN URINE: NEGATIVE
BUN BLDV-MCNC: 42 MG/DL (ref 8–23)
BUN/CREAT BLD: 19 (ref 9–20)
CALCIUM SERPL-MCNC: 8.5 MG/DL (ref 8.6–10.4)
CASTS UA: ABNORMAL /LPF
CASTS UA: ABNORMAL /LPF
CHLORIDE BLD-SCNC: 108 MMOL/L (ref 98–107)
CO2: 24 MMOL/L (ref 20–31)
COLOR: YELLOW
COMMENT UA: ABNORMAL
CREAT SERPL-MCNC: 2.24 MG/DL (ref 0.7–1.2)
CRYSTALS, UA: ABNORMAL /HPF
DIFFERENTIAL TYPE: ABNORMAL
EOSINOPHILS RELATIVE PERCENT: 2 % (ref 1–4)
EPITHELIAL CELLS UA: ABNORMAL /HPF (ref 0–5)
EXPIRATION DATE: NORMAL
GFR AFRICAN AMERICAN: 34 ML/MIN
GFR NON-AFRICAN AMERICAN: 28 ML/MIN
GFR SERPL CREATININE-BSD FRML MDRD: ABNORMAL ML/MIN/{1.73_M2}
GFR SERPL CREATININE-BSD FRML MDRD: ABNORMAL ML/MIN/{1.73_M2}
GLUCOSE BLD-MCNC: 136 MG/DL (ref 70–99)
GLUCOSE URINE: NEGATIVE
HCT VFR BLD CALC: 27.7 % (ref 40.7–50.3)
HEMOGLOBIN: 8.6 G/DL (ref 13–17)
IMMATURE GRANULOCYTES: 0 %
INR BLD: 1
KETONES, URINE: NEGATIVE
LEUKOCYTE ESTERASE, URINE: NEGATIVE
LYMPHOCYTES # BLD: 19 % (ref 24–43)
MCH RBC QN AUTO: 30.1 PG (ref 25.2–33.5)
MCHC RBC AUTO-ENTMCNC: 31 G/DL (ref 28.4–34.8)
MCV RBC AUTO: 96.9 FL (ref 82.6–102.9)
MONOCYTES # BLD: 9 % (ref 3–12)
MUCUS: ABNORMAL
NITRITE, URINE: NEGATIVE
NRBC AUTOMATED: 0 PER 100 WBC
OTHER OBSERVATIONS UA: ABNORMAL
PARTIAL THROMBOPLASTIN TIME: 23.3 SEC (ref 23–31)
PDW BLD-RTO: 12.9 % (ref 11.8–14.4)
PH UA: 6 (ref 5–8)
PLATELET # BLD: 128 K/UL (ref 138–453)
PLATELET ESTIMATE: ABNORMAL
PMV BLD AUTO: 10.3 FL (ref 8.1–13.5)
POTASSIUM SERPL-SCNC: 4.5 MMOL/L (ref 3.7–5.3)
PROTEIN UA: ABNORMAL
PROTHROMBIN TIME: 10 SEC (ref 9.7–11.6)
RBC # BLD: 2.86 M/UL (ref 4.21–5.77)
RBC # BLD: ABNORMAL 10*6/UL
RBC UA: ABNORMAL /HPF (ref 0–2)
RENAL EPITHELIAL, UA: ABNORMAL /HPF
SEG NEUTROPHILS: 70 % (ref 36–65)
SEGMENTED NEUTROPHILS ABSOLUTE COUNT: 4.91 K/UL (ref 1.5–8.1)
SODIUM BLD-SCNC: 142 MMOL/L (ref 135–144)
SPECIFIC GRAVITY UA: 1.02 (ref 1–1.03)
TRICHOMONAS: ABNORMAL
TROPONIN INTERP: ABNORMAL
TROPONIN T: ABNORMAL NG/ML
TROPONIN, HIGH SENSITIVITY: 78 NG/L (ref 0–22)
TURBIDITY: CLEAR
URINE HGB: NEGATIVE
UROBILINOGEN, URINE: NORMAL
WBC # BLD: 7.1 K/UL (ref 3.5–11.3)
WBC # BLD: ABNORMAL 10*3/UL
WBC UA: ABNORMAL /HPF (ref 0–5)
YEAST: ABNORMAL

## 2019-10-30 PROCEDURE — 93005 ELECTROCARDIOGRAM TRACING: CPT | Performed by: EMERGENCY MEDICINE

## 2019-10-30 PROCEDURE — 2580000003 HC RX 258: Performed by: EMERGENCY MEDICINE

## 2019-10-30 PROCEDURE — 85730 THROMBOPLASTIN TIME PARTIAL: CPT

## 2019-10-30 PROCEDURE — 86900 BLOOD TYPING SEROLOGIC ABO: CPT

## 2019-10-30 PROCEDURE — 81001 URINALYSIS AUTO W/SCOPE: CPT

## 2019-10-30 PROCEDURE — 86850 RBC ANTIBODY SCREEN: CPT

## 2019-10-30 PROCEDURE — 80048 BASIC METABOLIC PNL TOTAL CA: CPT

## 2019-10-30 PROCEDURE — 86901 BLOOD TYPING SEROLOGIC RH(D): CPT

## 2019-10-30 PROCEDURE — 85025 COMPLETE CBC W/AUTO DIFF WBC: CPT

## 2019-10-30 PROCEDURE — 85610 PROTHROMBIN TIME: CPT

## 2019-10-30 PROCEDURE — 84484 ASSAY OF TROPONIN QUANT: CPT

## 2019-10-30 PROCEDURE — 99283 EMERGENCY DEPT VISIT LOW MDM: CPT

## 2019-10-30 RX ORDER — 0.9 % SODIUM CHLORIDE 0.9 %
500 INTRAVENOUS SOLUTION INTRAVENOUS ONCE
Status: COMPLETED | OUTPATIENT
Start: 2019-10-30 | End: 2019-10-30

## 2019-10-30 RX ADMIN — SODIUM CHLORIDE 500 ML: 0.9 INJECTION, SOLUTION INTRAVENOUS at 16:20

## 2019-10-30 ASSESSMENT — PAIN SCALES - GENERAL: PAINLEVEL_OUTOF10: 8

## 2019-10-30 ASSESSMENT — PAIN DESCRIPTION - LOCATION: LOCATION: BACK

## 2019-10-30 ASSESSMENT — PAIN DESCRIPTION - PAIN TYPE: TYPE: CHRONIC PAIN

## 2019-11-01 ENCOUNTER — TELEPHONE (OUTPATIENT)
Dept: INTERNAL MEDICINE CLINIC | Age: 84
End: 2019-11-01

## 2019-11-01 DIAGNOSIS — E89.0 POSTOPERATIVE HYPOTHYROIDISM: Primary | ICD-10-CM

## 2019-11-02 LAB
EKG ATRIAL RATE: 77 BPM
EKG P AXIS: 31 DEGREES
EKG P-R INTERVAL: 176 MS
EKG Q-T INTERVAL: 368 MS
EKG QRS DURATION: 94 MS
EKG QTC CALCULATION (BAZETT): 416 MS
EKG R AXIS: 59 DEGREES
EKG T AXIS: 72 DEGREES
EKG VENTRICULAR RATE: 77 BPM

## 2019-11-02 PROCEDURE — 93010 ELECTROCARDIOGRAM REPORT: CPT | Performed by: INTERNAL MEDICINE

## 2019-11-04 ENCOUNTER — TELEPHONE (OUTPATIENT)
Dept: INTERNAL MEDICINE CLINIC | Age: 84
End: 2019-11-04

## 2019-11-04 RX ORDER — LEVOTHYROXINE SODIUM 0.1 MG/1
TABLET ORAL
Qty: 90 TABLET | Refills: 0 | Status: SHIPPED | OUTPATIENT
Start: 2019-11-04 | End: 2020-02-03

## 2019-11-04 RX ORDER — BUSPIRONE HYDROCHLORIDE 5 MG/1
5 TABLET ORAL 2 TIMES DAILY
Qty: 30 TABLET | Refills: 0 | Status: SHIPPED | OUTPATIENT
Start: 2019-11-04 | End: 2019-11-19

## 2019-11-13 DIAGNOSIS — E89.0 POSTOPERATIVE HYPOTHYROIDISM: ICD-10-CM

## 2019-11-13 LAB
T4 FREE: NORMAL
TSH SERPL DL<=0.05 MIU/L-ACNC: NORMAL M[IU]/L

## 2019-11-25 ENCOUNTER — TELEPHONE (OUTPATIENT)
Dept: INTERNAL MEDICINE CLINIC | Age: 84
End: 2019-11-25

## 2019-12-10 ENCOUNTER — TELEPHONE (OUTPATIENT)
Dept: INTERNAL MEDICINE CLINIC | Age: 84
End: 2019-12-10

## 2019-12-31 ENCOUNTER — OFFICE VISIT (OUTPATIENT)
Dept: INTERNAL MEDICINE CLINIC | Age: 84
End: 2019-12-31
Payer: COMMERCIAL

## 2019-12-31 VITALS
DIASTOLIC BLOOD PRESSURE: 60 MMHG | HEART RATE: 88 BPM | WEIGHT: 162 LBS | SYSTOLIC BLOOD PRESSURE: 134 MMHG | TEMPERATURE: 98 F | BODY MASS INDEX: 21.94 KG/M2 | HEIGHT: 72 IN

## 2019-12-31 DIAGNOSIS — M62.830 BACK SPASM: Primary | ICD-10-CM

## 2019-12-31 DIAGNOSIS — R26.2 AMBULATORY DYSFUNCTION: ICD-10-CM

## 2019-12-31 DIAGNOSIS — R11.0 NAUSEA: ICD-10-CM

## 2019-12-31 DIAGNOSIS — R97.20 ELEVATED PSA: ICD-10-CM

## 2019-12-31 DIAGNOSIS — M48.07 SPINAL STENOSIS OF LUMBOSACRAL REGION: ICD-10-CM

## 2019-12-31 DIAGNOSIS — M19.90 ARTHRITIS: ICD-10-CM

## 2019-12-31 PROCEDURE — 99214 OFFICE O/P EST MOD 30 MIN: CPT | Performed by: INTERNAL MEDICINE

## 2019-12-31 RX ORDER — ONDANSETRON 4 MG/1
4 TABLET, FILM COATED ORAL 3 TIMES DAILY PRN
Qty: 90 TABLET | Refills: 0 | Status: SHIPPED | OUTPATIENT
Start: 2019-12-31 | End: 2020-04-20

## 2019-12-31 RX ORDER — WHEELCHAIR
EACH MISCELLANEOUS
Qty: 1 EACH | Refills: 0 | Status: SHIPPED | OUTPATIENT
Start: 2019-12-31

## 2020-02-03 RX ORDER — LEVOTHYROXINE SODIUM 0.1 MG/1
TABLET ORAL
Qty: 90 TABLET | Refills: 1 | Status: SHIPPED | OUTPATIENT
Start: 2020-02-03 | End: 2020-07-22

## 2020-02-10 RX ORDER — PRAVASTATIN SODIUM 20 MG
TABLET ORAL
Qty: 90 TABLET | Refills: 1 | Status: SHIPPED | OUTPATIENT
Start: 2020-02-10 | End: 2020-08-10

## 2020-02-14 ENCOUNTER — TELEPHONE (OUTPATIENT)
Dept: INTERNAL MEDICINE CLINIC | Age: 85
End: 2020-02-14

## 2020-02-14 NOTE — TELEPHONE ENCOUNTER
Patient calling to get x-ray states he fell on Wed and today he is experiencing some pain in lower back, both legs,  And arms feel week he also states that he bump his head but doesn't believe he had a concussion

## 2020-02-14 NOTE — TELEPHONE ENCOUNTER
Patient wife stated he is having a hard time walking and can only stand up a little bit   she is going to calling the rescue to take him to [facility]

## 2020-02-21 ENCOUNTER — TELEPHONE (OUTPATIENT)
Dept: INTERNAL MEDICINE CLINIC | Age: 85
End: 2020-02-21

## 2020-02-24 ENCOUNTER — TELEPHONE (OUTPATIENT)
Dept: INTERNAL MEDICINE CLINIC | Age: 85
End: 2020-02-24

## 2020-04-01 RX ORDER — PANTOPRAZOLE SODIUM 40 MG/1
TABLET, DELAYED RELEASE ORAL
Qty: 180 TABLET | Refills: 1 | Status: SHIPPED | OUTPATIENT
Start: 2020-04-01 | End: 2020-09-29

## 2020-04-20 RX ORDER — ONDANSETRON 4 MG/1
TABLET, FILM COATED ORAL
Qty: 90 TABLET | Refills: 3 | Status: SHIPPED | OUTPATIENT
Start: 2020-04-20 | End: 2020-06-23

## 2020-05-05 RX ORDER — CLOPIDOGREL BISULFATE 75 MG/1
TABLET ORAL
Qty: 90 TABLET | Refills: 0 | Status: SHIPPED | OUTPATIENT
Start: 2020-05-05

## 2020-06-23 ENCOUNTER — OFFICE VISIT (OUTPATIENT)
Dept: INTERNAL MEDICINE CLINIC | Age: 85
End: 2020-06-23
Payer: COMMERCIAL

## 2020-06-23 VITALS
DIASTOLIC BLOOD PRESSURE: 74 MMHG | SYSTOLIC BLOOD PRESSURE: 144 MMHG | HEART RATE: 84 BPM | RESPIRATION RATE: 16 BRPM | WEIGHT: 161 LBS | TEMPERATURE: 98.1 F | HEIGHT: 72 IN | BODY MASS INDEX: 21.81 KG/M2

## 2020-06-23 PROCEDURE — 99214 OFFICE O/P EST MOD 30 MIN: CPT | Performed by: INTERNAL MEDICINE

## 2020-06-23 ASSESSMENT — PATIENT HEALTH QUESTIONNAIRE - PHQ9
SUM OF ALL RESPONSES TO PHQ QUESTIONS 1-9: 0
SUM OF ALL RESPONSES TO PHQ QUESTIONS 1-9: 0
1. LITTLE INTEREST OR PLEASURE IN DOING THINGS: 0
2. FEELING DOWN, DEPRESSED OR HOPELESS: 0
SUM OF ALL RESPONSES TO PHQ9 QUESTIONS 1 & 2: 0

## 2020-06-23 NOTE — PROGRESS NOTES
Chino Garcia is a 80 y.o. male who presents for   Chief Complaint   Patient presents with    Other     cancer haqs spread to legs, shoulders, as per Bone Scan.changing chemo meds, managed by Dr Shirley Lancaster. stated he isnt feeling too bad, aches relieved with Ibuprofen    and follow up of chronic medical problems. 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 ([address]    S/P TAVR (transcatheter aortic valve replacement)    Anemia    Weight loss    Other fatigue    Weakness    Duodenitis    Esophagitis    Large hiatal hernia    Acute kidney injury superimposed on chronic kidney disease (HCC)    Nausea vomiting and diarrhea    Diarrhea    Hiatus hernia syndrome    Moderate malnutrition (HCC)     HPI  Here for follow-up on blood pressure denies any new complaints other than his prostate cancer acting up and taking treatment    Current Outpatient Medications   Medication Sig Dispense Refill    clopidogrel (PLAVIX) 75 MG tablet TAKE 1 TABLET DAILY RESUME IN 3 TO 4 DAYS AS DIRECTED 90 tablet 0    pantoprazole (PROTONIX) 40 MG tablet TAKE 1 TABLET TWICE A DAY BEFORE MEALS 180 tablet 1    pravastatin (PRAVACHOL) 20 MG tablet TAKE 1 TABLET DAILY 90 tablet 1    levothyroxine (SYNTHROID) 100 MCG tablet TAKE 1 TABLET DAILY 90 tablet 1    senna (SENOKOT) 8.6 MG tablet Take 1 tablet by mouth 2 times daily as needed for Constipation 60 tablet 0    ondansetron (ZOFRAN) 4 MG tablet Take 1 tablet by mouth 3 times daily as needed for Nausea or Vomiting 90 tablet 0    Leuprolide Acetate (ELIGARD SC) Inject into the skin Twice yearly at MD office      traMADol (ULTRAM) 50 MG tablet Take 1 tablet by mouth every 8 hours as needed for Pain.        tiZANidine (ZANAFLEX) 2 MG tablet Take 2 mg by mouth every 6 hours as needed palpitations or leg swelling   Respiratory      : Negative for cough, shortness of breath or wheezing   Gastrointestinal: Negative for abdominal pain, constipation or diarrhea and bloating No nausea or vomiting   Genitourinary:     No urgency or frequency, no burning or hematuria   Musculoskeletal: Positive for arthralgias, back pain or myalgias   Skin                  : Negative for rash or erythema   Neurological    : Negative for dizziness, weakness, tremors ,light headedness or syncope   Psychiatric       : Negative for dysphoric mood, sleep disturbances, nervous or anxious, or decreased concentration   All other review of systems was negative    Objective  Physical Examination:    Nursing note reviewed    BP (!) 144/74   Pulse 84   Temp 98.1 °F (36.7 °C) (Infrared)   Resp 16   Ht 6' (1.829 m)   Wt 161 lb (73 kg)   BMI 21.84 kg/m²   BP Readings from Last 3 Encounters:   06/23/20 (!) 144/74   12/31/19 134/60   10/30/19 (!) 172/73         Constitutional:  Maximiliano De La Fuente is oriented to place, person and time ,appears well-developed and well-nourished  HEENT:  Atraumatic and normocephalic, external ears normal bilaterally, nose normal no oropharyngeal exudate and is clear and moist  Eyes:  EOCM normal; conjunctivae normal; PERRLA bilaterally  Neck:  Normal range of motion, neck supple, no JVD and no thyromegaly  Cardiovascular:  RRR, normal heart sounds and intact distal pulses  Pulmonary:  effort normal and breath sounds normal bilaterally,no wheezes or rales, no respiratory distress  Abdominal:  Soft, non-tender; normal bowel sounds, no masses  Musculoskeletal: Limited range of motion and no edema or tenderness bilaterally  No lymphadenopathy  Neurological:  alert, oriented, and normal reflexes bilaterally  Skin: warm and dry  Psychiatric:  normal mood and effect; behavior normal.    Labs:   No results found for: LABA1C  Lab Results   Component Value Date    CHOL 182 08/04/2017     Lab Results   Component answered. Pt voiced understanding. Omaira Dickerson will continue current medications, diet and exercise. No orders of the defined types were placed in this encounter. Completed Refills               Requested Prescriptions      No prescriptions requested or ordered in this encounter     4. Patient given educational materials - see patient instructions    5. Was a self-tracking handout given in paper form or via [employer]? NO    No orders of the defined types were placed in this encounter. No follow-ups on file. Patient voiced understanding and agreed to treatment plan. Electronically signed by Joce Carlos MD on 6/23/2020 at 3:05 PM    This note is created with a voice recognition program and while intend to generate a document that accurately reflects the content of the visit, no guarantee can be provided that every mistake has been identified and corrected by editing.

## 2020-07-14 ENCOUNTER — TELEPHONE (OUTPATIENT)
Dept: FAMILY MEDICINE CLINIC | Age: 85
End: 2020-07-14

## 2020-07-14 NOTE — TELEPHONE ENCOUNTER
Misbah Bryant was contacted to set up an annual wellness visit    Spoke with: pt needs to schedule awv       Jay Gonzales

## 2020-07-22 RX ORDER — LEVOTHYROXINE SODIUM 0.1 MG/1
TABLET ORAL
Qty: 90 TABLET | Refills: 1 | Status: SHIPPED | OUTPATIENT
Start: 2020-07-22

## 2020-07-22 NOTE — TELEPHONE ENCOUNTER
Last visit: 6/23/2020  Last Med refill: 2/3/2020  Does patient have enough medication for 72 hours: NA    Next Visit Date:  Future Appointments   Date Time Provider Fritz Roni   12/16/2020  3:15 PM Bre Carter MD [facility] [address] Maintenance   Topic Date Due    DTaP/Tdap/Td vaccine (1 - Tdap) 05/23/1948    PSA counseling  05/23/1979    Shingles Vaccine (2 of 3) 01/30/2017    Lipid screen  08/04/2018    Annual Wellness Visit (AWV)  05/29/2019    Flu vaccine (1) 09/01/2020    Potassium monitoring  10/30/2020    Creatinine monitoring  10/30/2020    TSH testing  11/08/2020    Pneumococcal 65+ yrs at Risk Vaccine  Completed    Hepatitis A vaccine  Aged Out    Hepatitis B vaccine  Aged Out    Hib vaccine  Aged Out    Meningococcal (ACWY) vaccine  Aged Out       No results found for: LABA1C          ( goal A1C is < 7)   No results found for: LABMICR  LDL Calculated (mg/dL)   Date Value   08/04/2017 93   05/24/2017 107       (goal LDL is <100)   AST (U/L)   Date Value   10/17/2019 12     ALT (U/L)   Date Value   10/17/2019 8     BUN (mg/dL)   Date Value   10/30/2019 42 (H)     BP Readings from Last 3 Encounters:   06/23/20 (!) 144/74   12/31/19 134/60   10/30/19 (!) 172/73          (goal 120/80)    All Future Testing planned in [facility]  Lab Frequency Next Occurrence   CBC Once 09/30/2019   Iron and TIBC Once 09/30/2019   Ferritin Once 09/30/2019   Hemoglobin and Hematocrit, Blood, Post Transfusion POST TRANSFUSION                Patient Active Problem List:     Essential hypertension     High cholesterol     Hypothyroid     CRF (chronic renal failure)     Gastroenteritis     ARF (acute renal failure) (HCC)     Severe anemia     CKD stage 4 secondary to hypertension (HCC)     S/P TAVR (transcatheter aortic valve replacement)     Anemia     Weight loss     Other fatigue     Weakness     Duodenitis     Esophagitis     Large hiatal hernia     Acute kidney injury superimposed on chronic kidney disease (HCC)     Nausea vomiting and diarrhea     Diarrhea     Hiatus hernia syndrome     Moderate malnutrition ([address]

## 2020-08-10 RX ORDER — PRAVASTATIN SODIUM 20 MG
TABLET ORAL
Qty: 90 TABLET | Refills: 3 | Status: SHIPPED | OUTPATIENT
Start: 2020-08-10

## 2020-08-14 RX ORDER — ONDANSETRON 4 MG/1
4 TABLET, FILM COATED ORAL 3 TIMES DAILY PRN
Qty: 10 TABLET | Refills: 0 | Status: SHIPPED | OUTPATIENT
Start: 2020-08-14

## 2020-08-14 NOTE — TELEPHONE ENCOUNTER
[facility] is calling to request a refill on the following medication(s):  Asking for short supply to last while waiting on mail order to arrive.     Medication Request:  Requested Prescriptions     Pending Prescriptions Disp Refills    ondansetron (ZOFRAN) 4 MG tablet 90 tablet 0     Sig: Take 1 tablet by mouth 3 times daily as needed for Nausea or Vomiting       Last Visit Date (If Applicable):  3/20/5161    Next Visit Date:    12/16/2020

## 2020-08-22 ENCOUNTER — HOSPITAL ENCOUNTER (INPATIENT)
Age: 85
LOS: 2 days | Discharge: HOSPICE | DRG: 640 | End: 2020-08-24
Attending: EMERGENCY MEDICINE | Admitting: FAMILY MEDICINE
Payer: COMMERCIAL

## 2020-08-22 ENCOUNTER — APPOINTMENT (OUTPATIENT)
Dept: GENERAL RADIOLOGY | Age: 85
DRG: 640 | End: 2020-08-22
Payer: COMMERCIAL

## 2020-08-22 ENCOUNTER — APPOINTMENT (OUTPATIENT)
Dept: CT IMAGING | Age: 85
DRG: 640 | End: 2020-08-22
Payer: COMMERCIAL

## 2020-08-22 PROBLEM — E86.0 DEHYDRATION: Status: ACTIVE | Noted: 2020-08-22

## 2020-08-22 LAB
-: ABNORMAL
ABSOLUTE EOS #: 0.36 K/UL (ref 0–0.44)
ABSOLUTE IMMATURE GRANULOCYTE: 0.15 K/UL (ref 0–0.3)
ABSOLUTE LYMPH #: 1.59 K/UL (ref 1.1–3.7)
ABSOLUTE MONO #: 0.81 K/UL (ref 0.1–1.2)
ALBUMIN SERPL-MCNC: 3.5 G/DL (ref 3.5–5.2)
ALBUMIN/GLOBULIN RATIO: ABNORMAL (ref 1–2.5)
ALP BLD-CCNC: 1174 U/L (ref 40–129)
ALT SERPL-CCNC: 8 U/L (ref 5–41)
AMORPHOUS: ABNORMAL
ANION GAP SERPL CALCULATED.3IONS-SCNC: 13 MMOL/L (ref 9–17)
AST SERPL-CCNC: 16 U/L
BACTERIA: ABNORMAL
BASOPHILS # BLD: 1 % (ref 0–2)
BASOPHILS ABSOLUTE: 0.05 K/UL (ref 0–0.2)
BILIRUB SERPL-MCNC: 0.4 MG/DL (ref 0.3–1.2)
BILIRUBIN DIRECT: 0.1 MG/DL
BILIRUBIN URINE: NEGATIVE
BILIRUBIN, INDIRECT: 0.3 MG/DL (ref 0–1)
BUN BLDV-MCNC: 32 MG/DL (ref 8–23)
BUN/CREAT BLD: 14 (ref 9–20)
CALCIUM SERPL-MCNC: 8.6 MG/DL (ref 8.6–10.4)
CASTS UA: ABNORMAL /LPF
CASTS UA: ABNORMAL /LPF
CHLORIDE BLD-SCNC: 106 MMOL/L (ref 98–107)
CO2: 24 MMOL/L (ref 20–31)
COLOR: YELLOW
COMMENT UA: ABNORMAL
CREAT SERPL-MCNC: 2.34 MG/DL (ref 0.7–1.2)
CRYSTALS, UA: ABNORMAL /HPF
DIFFERENTIAL TYPE: ABNORMAL
EOSINOPHILS RELATIVE PERCENT: 5 % (ref 1–4)
EPITHELIAL CELLS UA: ABNORMAL /HPF (ref 0–5)
GFR AFRICAN AMERICAN: 32 ML/MIN
GFR NON-AFRICAN AMERICAN: 26 ML/MIN
GFR SERPL CREATININE-BSD FRML MDRD: ABNORMAL ML/MIN/{1.73_M2}
GFR SERPL CREATININE-BSD FRML MDRD: ABNORMAL ML/MIN/{1.73_M2}
GLOBULIN: ABNORMAL G/DL (ref 1.5–3.8)
GLUCOSE BLD-MCNC: 128 MG/DL (ref 70–99)
GLUCOSE URINE: NEGATIVE
HCT VFR BLD CALC: 26.2 % (ref 40.7–50.3)
HEMOGLOBIN: 8.2 G/DL (ref 13–17)
IMMATURE GRANULOCYTES: 2 %
KETONES, URINE: NEGATIVE
LEUKOCYTE ESTERASE, URINE: NEGATIVE
LIPASE: 18 U/L (ref 13–60)
LYMPHOCYTES # BLD: 21 % (ref 24–43)
MAGNESIUM: 1.7 MG/DL (ref 1.6–2.6)
MCH RBC QN AUTO: 30.9 PG (ref 25.2–33.5)
MCHC RBC AUTO-ENTMCNC: 31.3 G/DL (ref 28.4–34.8)
MCV RBC AUTO: 98.9 FL (ref 82.6–102.9)
MONOCYTES # BLD: 10 % (ref 3–12)
MUCUS: ABNORMAL
MYOGLOBIN: 133 NG/ML (ref 28–72)
MYOGLOBIN: 138 NG/ML (ref 28–72)
NITRITE, URINE: NEGATIVE
NRBC AUTOMATED: 0 PER 100 WBC
OTHER OBSERVATIONS UA: ABNORMAL
PDW BLD-RTO: 14 % (ref 11.8–14.4)
PH UA: 6.5 (ref 5–8)
PHOSPHORUS: 3.6 MG/DL (ref 2.5–4.5)
PLATELET # BLD: 166 K/UL (ref 138–453)
PLATELET ESTIMATE: ABNORMAL
PMV BLD AUTO: 10 FL (ref 8.1–13.5)
POTASSIUM SERPL-SCNC: 3.9 MMOL/L (ref 3.7–5.3)
PROTEIN UA: ABNORMAL
RBC # BLD: 2.65 M/UL (ref 4.21–5.77)
RBC # BLD: ABNORMAL 10*6/UL
RBC UA: ABNORMAL /HPF (ref 0–2)
RENAL EPITHELIAL, UA: ABNORMAL /HPF
SEG NEUTROPHILS: 61 % (ref 36–65)
SEGMENTED NEUTROPHILS ABSOLUTE COUNT: 4.81 K/UL (ref 1.5–8.1)
SODIUM BLD-SCNC: 143 MMOL/L (ref 135–144)
SPECIFIC GRAVITY UA: 1.02 (ref 1–1.03)
TOTAL PROTEIN: 5.8 G/DL (ref 6.4–8.3)
TRICHOMONAS: ABNORMAL
TROPONIN INTERP: ABNORMAL
TROPONIN INTERP: ABNORMAL
TROPONIN T: ABNORMAL NG/ML
TROPONIN T: ABNORMAL NG/ML
TROPONIN, HIGH SENSITIVITY: 118 NG/L (ref 0–22)
TROPONIN, HIGH SENSITIVITY: 123 NG/L (ref 0–22)
TSH SERPL DL<=0.05 MIU/L-ACNC: 8.42 MIU/L (ref 0.3–5)
TURBIDITY: CLEAR
URINE HGB: ABNORMAL
UROBILINOGEN, URINE: NORMAL
WBC # BLD: 7.8 K/UL (ref 3.5–11.3)
WBC # BLD: ABNORMAL 10*3/UL
WBC UA: ABNORMAL /HPF (ref 0–5)
YEAST: ABNORMAL

## 2020-08-22 PROCEDURE — 84436 ASSAY OF TOTAL THYROXINE: CPT

## 2020-08-22 PROCEDURE — 6360000002 HC RX W HCPCS: Performed by: NURSE PRACTITIONER

## 2020-08-22 PROCEDURE — 83874 ASSAY OF MYOGLOBIN: CPT

## 2020-08-22 PROCEDURE — 71045 X-RAY EXAM CHEST 1 VIEW: CPT

## 2020-08-22 PROCEDURE — 83735 ASSAY OF MAGNESIUM: CPT

## 2020-08-22 PROCEDURE — 84484 ASSAY OF TROPONIN QUANT: CPT

## 2020-08-22 PROCEDURE — 84100 ASSAY OF PHOSPHORUS: CPT

## 2020-08-22 PROCEDURE — 99222 1ST HOSP IP/OBS MODERATE 55: CPT | Performed by: NURSE PRACTITIONER

## 2020-08-22 PROCEDURE — 2580000003 HC RX 258: Performed by: NURSE PRACTITIONER

## 2020-08-22 PROCEDURE — 2060000000 HC ICU INTERMEDIATE R&B

## 2020-08-22 PROCEDURE — 85025 COMPLETE CBC W/AUTO DIFF WBC: CPT

## 2020-08-22 PROCEDURE — 81001 URINALYSIS AUTO W/SCOPE: CPT

## 2020-08-22 PROCEDURE — 96374 THER/PROPH/DIAG INJ IV PUSH: CPT

## 2020-08-22 PROCEDURE — 80048 BASIC METABOLIC PNL TOTAL CA: CPT

## 2020-08-22 PROCEDURE — 73030 X-RAY EXAM OF SHOULDER: CPT

## 2020-08-22 PROCEDURE — 96361 HYDRATE IV INFUSION ADD-ON: CPT

## 2020-08-22 PROCEDURE — 74176 CT ABD & PELVIS W/O CONTRAST: CPT

## 2020-08-22 PROCEDURE — 80076 HEPATIC FUNCTION PANEL: CPT

## 2020-08-22 PROCEDURE — 83690 ASSAY OF LIPASE: CPT

## 2020-08-22 PROCEDURE — 99285 EMERGENCY DEPT VISIT HI MDM: CPT

## 2020-08-22 PROCEDURE — 84443 ASSAY THYROID STIM HORMONE: CPT

## 2020-08-22 PROCEDURE — 93005 ELECTROCARDIOGRAM TRACING: CPT | Performed by: NURSE PRACTITIONER

## 2020-08-22 RX ORDER — ONDANSETRON 2 MG/ML
4 INJECTION INTRAMUSCULAR; INTRAVENOUS ONCE
Status: COMPLETED | OUTPATIENT
Start: 2020-08-22 | End: 2020-08-22

## 2020-08-22 RX ORDER — ASPIRIN 81 MG/1
324 TABLET, CHEWABLE ORAL ONCE
Status: COMPLETED | OUTPATIENT
Start: 2020-08-22 | End: 2020-08-23

## 2020-08-22 RX ORDER — 0.9 % SODIUM CHLORIDE 0.9 %
1000 INTRAVENOUS SOLUTION INTRAVENOUS ONCE
Status: COMPLETED | OUTPATIENT
Start: 2020-08-22 | End: 2020-08-22

## 2020-08-22 RX ADMIN — SODIUM CHLORIDE 1000 ML: 9 INJECTION, SOLUTION INTRAVENOUS at 21:13

## 2020-08-22 RX ADMIN — ONDANSETRON 4 MG: 2 INJECTION INTRAMUSCULAR; INTRAVENOUS at 21:13

## 2020-08-22 ASSESSMENT — ENCOUNTER SYMPTOMS
DIARRHEA: 0
NAUSEA: 1
SHORTNESS OF BREATH: 0
COLOR CHANGE: 0
BACK PAIN: 0
PHOTOPHOBIA: 0
ABDOMINAL PAIN: 0
VOMITING: 1
SORE THROAT: 0
COUGH: 0
CONSTIPATION: 0

## 2020-08-22 ASSESSMENT — PAIN SCALES - GENERAL: PAINLEVEL_OUTOF10: 6

## 2020-08-23 PROBLEM — I50.33 ACUTE ON CHRONIC DIASTOLIC CONGESTIVE HEART FAILURE, NYHA CLASS 3 (HCC): Status: ACTIVE | Noted: 2020-08-07

## 2020-08-23 PROBLEM — E44.0 MODERATE MALNUTRITION (HCC): Chronic | Status: ACTIVE | Noted: 2020-08-23

## 2020-08-23 PROBLEM — C61 ADENOCARCINOMA OF PROSTATE (HCC): Status: ACTIVE | Noted: 2020-08-07

## 2020-08-23 LAB
ANION GAP SERPL CALCULATED.3IONS-SCNC: 12 MMOL/L (ref 9–17)
BUN BLDV-MCNC: 31 MG/DL (ref 8–23)
BUN/CREAT BLD: 14 (ref 9–20)
CALCIUM SERPL-MCNC: 8.3 MG/DL (ref 8.6–10.4)
CHLORIDE BLD-SCNC: 110 MMOL/L (ref 98–107)
CO2: 23 MMOL/L (ref 20–31)
CREAT SERPL-MCNC: 2.22 MG/DL (ref 0.7–1.2)
GFR AFRICAN AMERICAN: 34 ML/MIN
GFR NON-AFRICAN AMERICAN: 28 ML/MIN
GFR SERPL CREATININE-BSD FRML MDRD: ABNORMAL ML/MIN/{1.73_M2}
GFR SERPL CREATININE-BSD FRML MDRD: ABNORMAL ML/MIN/{1.73_M2}
GLUCOSE BLD-MCNC: 94 MG/DL (ref 70–99)
HCT VFR BLD CALC: 25.3 % (ref 40.7–50.3)
HEMOGLOBIN: 7.7 G/DL (ref 13–17)
INR BLD: 1
MCH RBC QN AUTO: 30.4 PG (ref 25.2–33.5)
MCHC RBC AUTO-ENTMCNC: 30.4 G/DL (ref 28.4–34.8)
MCV RBC AUTO: 100 FL (ref 82.6–102.9)
NRBC AUTOMATED: 0 PER 100 WBC
PDW BLD-RTO: 13.9 % (ref 11.8–14.4)
PLATELET # BLD: 151 K/UL (ref 138–453)
PMV BLD AUTO: 10.3 FL (ref 8.1–13.5)
POTASSIUM SERPL-SCNC: 3.7 MMOL/L (ref 3.7–5.3)
PROTHROMBIN TIME: 13.2 SEC (ref 11.5–14.2)
RBC # BLD: 2.53 M/UL (ref 4.21–5.77)
SODIUM BLD-SCNC: 145 MMOL/L (ref 135–144)
T4 TOTAL: 6.4 UG/DL (ref 4.5–10.9)
TROPONIN INTERP: ABNORMAL
TROPONIN T: ABNORMAL NG/ML
TROPONIN, HIGH SENSITIVITY: 125 NG/L (ref 0–22)
TROPONIN, HIGH SENSITIVITY: 126 NG/L (ref 0–22)
TROPONIN, HIGH SENSITIVITY: 130 NG/L (ref 0–22)
WBC # BLD: 7.7 K/UL (ref 3.5–11.3)

## 2020-08-23 PROCEDURE — 6360000002 HC RX W HCPCS: Performed by: NURSE PRACTITIONER

## 2020-08-23 PROCEDURE — 2500000003 HC RX 250 WO HCPCS: Performed by: FAMILY MEDICINE

## 2020-08-23 PROCEDURE — 6370000000 HC RX 637 (ALT 250 FOR IP): Performed by: FAMILY MEDICINE

## 2020-08-23 PROCEDURE — 85027 COMPLETE CBC AUTOMATED: CPT

## 2020-08-23 PROCEDURE — 84153 ASSAY OF PSA TOTAL: CPT

## 2020-08-23 PROCEDURE — 1200000000 HC SEMI PRIVATE

## 2020-08-23 PROCEDURE — 80048 BASIC METABOLIC PNL TOTAL CA: CPT

## 2020-08-23 PROCEDURE — 6370000000 HC RX 637 (ALT 250 FOR IP): Performed by: INTERNAL MEDICINE

## 2020-08-23 PROCEDURE — 99221 1ST HOSP IP/OBS SF/LOW 40: CPT | Performed by: NURSE PRACTITIONER

## 2020-08-23 PROCEDURE — 36415 COLL VENOUS BLD VENIPUNCTURE: CPT

## 2020-08-23 PROCEDURE — 84484 ASSAY OF TROPONIN QUANT: CPT

## 2020-08-23 PROCEDURE — 6370000000 HC RX 637 (ALT 250 FOR IP): Performed by: NURSE PRACTITIONER

## 2020-08-23 PROCEDURE — 99232 SBSQ HOSP IP/OBS MODERATE 35: CPT | Performed by: FAMILY MEDICINE

## 2020-08-23 PROCEDURE — 2580000003 HC RX 258: Performed by: NURSE PRACTITIONER

## 2020-08-23 PROCEDURE — 85610 PROTHROMBIN TIME: CPT

## 2020-08-23 PROCEDURE — 6370000000 HC RX 637 (ALT 250 FOR IP): Performed by: EMERGENCY MEDICINE

## 2020-08-23 PROCEDURE — 93005 ELECTROCARDIOGRAM TRACING: CPT | Performed by: NURSE PRACTITIONER

## 2020-08-23 RX ORDER — HYDROCODONE BITARTRATE AND ACETAMINOPHEN 5; 325 MG/1; MG/1
1 TABLET ORAL EVERY 6 HOURS PRN
Status: DISCONTINUED | OUTPATIENT
Start: 2020-08-23 | End: 2020-08-24 | Stop reason: HOSPADM

## 2020-08-23 RX ORDER — NICOTINE 21 MG/24HR
1 PATCH, TRANSDERMAL 24 HOURS TRANSDERMAL DAILY PRN
Status: DISCONTINUED | OUTPATIENT
Start: 2020-08-23 | End: 2020-08-24 | Stop reason: HOSPADM

## 2020-08-23 RX ORDER — ONDANSETRON 2 MG/ML
4 INJECTION INTRAMUSCULAR; INTRAVENOUS EVERY 6 HOURS PRN
Status: DISCONTINUED | OUTPATIENT
Start: 2020-08-23 | End: 2020-08-24 | Stop reason: HOSPADM

## 2020-08-23 RX ORDER — PANTOPRAZOLE SODIUM 40 MG/1
40 TABLET, DELAYED RELEASE ORAL
Status: DISCONTINUED | OUTPATIENT
Start: 2020-08-23 | End: 2020-08-24 | Stop reason: HOSPADM

## 2020-08-23 RX ORDER — AMLODIPINE BESYLATE 10 MG/1
10 TABLET ORAL DAILY
Status: DISCONTINUED | OUTPATIENT
Start: 2020-08-24 | End: 2020-08-24 | Stop reason: HOSPADM

## 2020-08-23 RX ORDER — ALBUTEROL SULFATE 90 UG/1
2 AEROSOL, METERED RESPIRATORY (INHALATION) EVERY 6 HOURS PRN
Status: DISCONTINUED | OUTPATIENT
Start: 2020-08-23 | End: 2020-08-24 | Stop reason: HOSPADM

## 2020-08-23 RX ORDER — AMLODIPINE BESYLATE 5 MG/1
5 TABLET ORAL ONCE
Status: COMPLETED | OUTPATIENT
Start: 2020-08-23 | End: 2020-08-23

## 2020-08-23 RX ORDER — POLYETHYLENE GLYCOL 3350 17 G/17G
17 POWDER, FOR SOLUTION ORAL DAILY PRN
Status: DISCONTINUED | OUTPATIENT
Start: 2020-08-23 | End: 2020-08-24 | Stop reason: HOSPADM

## 2020-08-23 RX ORDER — AMLODIPINE BESYLATE 2.5 MG/1
2.5 TABLET ORAL DAILY
Status: DISCONTINUED | OUTPATIENT
Start: 2020-08-23 | End: 2020-08-23

## 2020-08-23 RX ORDER — POTASSIUM CHLORIDE 7.45 MG/ML
10 INJECTION INTRAVENOUS PRN
Status: DISCONTINUED | OUTPATIENT
Start: 2020-08-23 | End: 2020-08-23

## 2020-08-23 RX ORDER — LEVOTHYROXINE SODIUM 0.1 MG/1
100 TABLET ORAL DAILY
Status: DISCONTINUED | OUTPATIENT
Start: 2020-08-23 | End: 2020-08-24 | Stop reason: HOSPADM

## 2020-08-23 RX ORDER — ACETAMINOPHEN 325 MG/1
650 TABLET ORAL EVERY 6 HOURS PRN
Status: DISCONTINUED | OUTPATIENT
Start: 2020-08-23 | End: 2020-08-24 | Stop reason: HOSPADM

## 2020-08-23 RX ORDER — SODIUM CHLORIDE 9 MG/ML
INJECTION, SOLUTION INTRAVENOUS CONTINUOUS
Status: DISCONTINUED | OUTPATIENT
Start: 2020-08-23 | End: 2020-08-23

## 2020-08-23 RX ORDER — SODIUM CHLORIDE 0.9 % (FLUSH) 0.9 %
10 SYRINGE (ML) INJECTION EVERY 12 HOURS SCHEDULED
Status: DISCONTINUED | OUTPATIENT
Start: 2020-08-23 | End: 2020-08-24 | Stop reason: HOSPADM

## 2020-08-23 RX ORDER — ACETAMINOPHEN 650 MG/1
650 SUPPOSITORY RECTAL EVERY 6 HOURS PRN
Status: DISCONTINUED | OUTPATIENT
Start: 2020-08-23 | End: 2020-08-24 | Stop reason: HOSPADM

## 2020-08-23 RX ORDER — MAGNESIUM SULFATE 1 G/100ML
1 INJECTION INTRAVENOUS PRN
Status: DISCONTINUED | OUTPATIENT
Start: 2020-08-23 | End: 2020-08-23

## 2020-08-23 RX ORDER — HEPARIN SODIUM 5000 [USP'U]/ML
5000 INJECTION, SOLUTION INTRAVENOUS; SUBCUTANEOUS EVERY 8 HOURS SCHEDULED
Status: DISCONTINUED | OUTPATIENT
Start: 2020-08-23 | End: 2020-08-24 | Stop reason: HOSPADM

## 2020-08-23 RX ORDER — SODIUM CHLORIDE 0.9 % (FLUSH) 0.9 %
10 SYRINGE (ML) INJECTION PRN
Status: DISCONTINUED | OUTPATIENT
Start: 2020-08-23 | End: 2020-08-24 | Stop reason: HOSPADM

## 2020-08-23 RX ORDER — POTASSIUM CHLORIDE 20 MEQ/1
40 TABLET, EXTENDED RELEASE ORAL PRN
Status: DISCONTINUED | OUTPATIENT
Start: 2020-08-23 | End: 2020-08-23

## 2020-08-23 RX ORDER — DEXTROSE, SODIUM CHLORIDE, AND POTASSIUM CHLORIDE 5; .45; .15 G/100ML; G/100ML; G/100ML
INJECTION INTRAVENOUS CONTINUOUS
Status: DISCONTINUED | OUTPATIENT
Start: 2020-08-23 | End: 2020-08-24 | Stop reason: HOSPADM

## 2020-08-23 RX ORDER — ASPIRIN 81 MG/1
TABLET, CHEWABLE ORAL
Status: DISPENSED
Start: 2020-08-23 | End: 2020-08-23

## 2020-08-23 RX ORDER — CLOPIDOGREL BISULFATE 75 MG/1
75 TABLET ORAL DAILY
Status: DISCONTINUED | OUTPATIENT
Start: 2020-08-23 | End: 2020-08-24 | Stop reason: HOSPADM

## 2020-08-23 RX ORDER — PRAVASTATIN SODIUM 20 MG
20 TABLET ORAL DAILY
Status: DISCONTINUED | OUTPATIENT
Start: 2020-08-23 | End: 2020-08-24 | Stop reason: HOSPADM

## 2020-08-23 RX ORDER — PROMETHAZINE HYDROCHLORIDE 12.5 MG/1
12.5 TABLET ORAL EVERY 6 HOURS PRN
Status: DISCONTINUED | OUTPATIENT
Start: 2020-08-23 | End: 2020-08-24 | Stop reason: HOSPADM

## 2020-08-23 RX ORDER — ASPIRIN 81 MG/1
81 TABLET ORAL DAILY
Status: DISCONTINUED | OUTPATIENT
Start: 2020-08-23 | End: 2020-08-24 | Stop reason: HOSPADM

## 2020-08-23 RX ADMIN — CLOPIDOGREL BISULFATE 75 MG: 75 TABLET ORAL at 09:08

## 2020-08-23 RX ADMIN — HYDROCODONE BITARTRATE AND ACETAMINOPHEN 1 TABLET: 5; 325 TABLET ORAL at 20:56

## 2020-08-23 RX ADMIN — PROMETHAZINE HYDROCHLORIDE 12.5 MG: 12.5 TABLET ORAL at 07:35

## 2020-08-23 RX ADMIN — POTASSIUM CHLORIDE, DEXTROSE MONOHYDRATE AND SODIUM CHLORIDE: 150; 5; 450 INJECTION, SOLUTION INTRAVENOUS at 22:16

## 2020-08-23 RX ADMIN — PANTOPRAZOLE SODIUM 40 MG: 40 TABLET, DELAYED RELEASE ORAL at 18:14

## 2020-08-23 RX ADMIN — ONDANSETRON 4 MG: 2 INJECTION INTRAMUSCULAR; INTRAVENOUS at 12:49

## 2020-08-23 RX ADMIN — PANTOPRAZOLE SODIUM 40 MG: 40 TABLET, DELAYED RELEASE ORAL at 06:07

## 2020-08-23 RX ADMIN — ASPIRIN 81 MG 324 MG: 81 TABLET ORAL at 00:20

## 2020-08-23 RX ADMIN — HEPARIN SODIUM 5000 UNITS: 5000 INJECTION INTRAVENOUS; SUBCUTANEOUS at 20:59

## 2020-08-23 RX ADMIN — AMLODIPINE BESYLATE 2.5 MG: 2.5 TABLET ORAL at 09:08

## 2020-08-23 RX ADMIN — ASPIRIN 81 MG: 81 TABLET, COATED ORAL at 09:08

## 2020-08-23 RX ADMIN — HEPARIN SODIUM 5000 UNITS: 5000 INJECTION INTRAVENOUS; SUBCUTANEOUS at 06:07

## 2020-08-23 RX ADMIN — ONDANSETRON 4 MG: 2 INJECTION INTRAMUSCULAR; INTRAVENOUS at 06:07

## 2020-08-23 RX ADMIN — ACETAMINOPHEN 650 MG: 325 TABLET ORAL at 07:38

## 2020-08-23 RX ADMIN — LEVOTHYROXINE SODIUM 100 MCG: 0.1 TABLET ORAL at 06:07

## 2020-08-23 RX ADMIN — PRAVASTATIN SODIUM 20 MG: 20 TABLET ORAL at 09:08

## 2020-08-23 RX ADMIN — POTASSIUM CHLORIDE, DEXTROSE MONOHYDRATE AND SODIUM CHLORIDE: 150; 5; 450 INJECTION, SOLUTION INTRAVENOUS at 10:40

## 2020-08-23 RX ADMIN — AMLODIPINE BESYLATE 5 MG: 5 TABLET ORAL at 10:40

## 2020-08-23 RX ADMIN — HEPARIN SODIUM 5000 UNITS: 5000 INJECTION INTRAVENOUS; SUBCUTANEOUS at 12:51

## 2020-08-23 RX ADMIN — SODIUM CHLORIDE: 9 INJECTION, SOLUTION INTRAVENOUS at 02:03

## 2020-08-23 ASSESSMENT — PAIN SCALES - GENERAL
PAINLEVEL_OUTOF10: 5
PAINLEVEL_OUTOF10: 6
PAINLEVEL_OUTOF10: 3
PAINLEVEL_OUTOF10: 5
PAINLEVEL_OUTOF10: 6
PAINLEVEL_OUTOF10: 5

## 2020-08-23 ASSESSMENT — ENCOUNTER SYMPTOMS
VOMITING: 0
CONSTIPATION: 0
BACK PAIN: 0
NAUSEA: 1
RECTAL PAIN: 0
TROUBLE SWALLOWING: 1
ABDOMINAL DISTENTION: 0
ALLERGIC/IMMUNOLOGIC NEGATIVE: 1
RESPIRATORY NEGATIVE: 1
ABDOMINAL PAIN: 0
DIARRHEA: 0
EYES NEGATIVE: 1
SHORTNESS OF BREATH: 1
BLOOD IN STOOL: 0
ANAL BLEEDING: 0

## 2020-08-23 ASSESSMENT — PAIN - FUNCTIONAL ASSESSMENT
PAIN_FUNCTIONAL_ASSESSMENT: ACTIVITIES ARE NOT PREVENTED

## 2020-08-23 ASSESSMENT — PAIN DESCRIPTION - ORIENTATION
ORIENTATION: RIGHT;LEFT
ORIENTATION: RIGHT;LOWER
ORIENTATION: RIGHT;LEFT
ORIENTATION: RIGHT;LOWER

## 2020-08-23 ASSESSMENT — PAIN DESCRIPTION - PROGRESSION

## 2020-08-23 ASSESSMENT — PAIN DESCRIPTION - DESCRIPTORS
DESCRIPTORS: ACHING
DESCRIPTORS: DISCOMFORT
DESCRIPTORS: ACHING
DESCRIPTORS: DISCOMFORT

## 2020-08-23 ASSESSMENT — PAIN DESCRIPTION - PAIN TYPE
TYPE: CHRONIC PAIN

## 2020-08-23 ASSESSMENT — PAIN DESCRIPTION - FREQUENCY
FREQUENCY: INTERMITTENT

## 2020-08-23 ASSESSMENT — PAIN DESCRIPTION - ONSET
ONSET: ON-GOING
ONSET: GRADUAL
ONSET: ON-GOING
ONSET: GRADUAL

## 2020-08-23 ASSESSMENT — PAIN DESCRIPTION - LOCATION
LOCATION: RIB CAGE
LOCATION: RIB CAGE
LOCATION: RIB CAGE;BACK
LOCATION: BACK;RIB CAGE

## 2020-08-23 NOTE — PLAN OF CARE
Problem: Falls - Risk of:  Goal: Will remain free from falls  Description: Will remain free from falls  Outcome: Ongoing  Goal: Absence of physical injury  Description: Absence of physical injury  Outcome: Ongoing     Problem: Skin Integrity:  Goal: Will show no infection signs and symptoms  Description: Will show no infection signs and symptoms  Outcome: Ongoing  Goal: Absence of new skin breakdown  Description: Absence of new skin breakdown  Outcome: Ongoing     Problem: Pain:  Goal: Pain level will decrease  Description: Pain level will decrease  Outcome: Ongoing  Goal: Control of acute pain  Description: Control of acute pain  Outcome: Ongoing  Goal: Control of chronic pain  Description: Control of chronic pain  Outcome: Ongoing  Goal: Patient's pain/discomfort is manageable  Description: Patient's pain/discomfort is manageable  Outcome: Ongoing     Problem: Infection:  Goal: Will remain free from infection  Description: Will remain free from infection  Outcome: Ongoing     Problem: Safety:  Goal: Free from accidental physical injury  Description: Free from accidental physical injury  Outcome: Ongoing  Goal: Free from intentional harm  Description: Free from intentional harm  Outcome: Ongoing     Problem: Daily Care:  Goal: Daily care needs are met  Description: Daily care needs are met  Outcome: Ongoing     Problem: Skin Integrity:  Goal: Skin integrity will stabilize  Description: Skin integrity will stabilize  Outcome: Ongoing     Problem: Discharge Planning:  Goal: Patients continuum of care needs are met  Description: Patients continuum of care needs are met  Outcome: Ongoing

## 2020-08-23 NOTE — PROGRESS NOTES
[facility]    Progress Note    8/23/2020    3:15 PM    Name:   Amos Rapp  MRN:     [id number]     Acct:      [de-identified]   Room:   [facility]/[address] [place] [address] Day:  1  Admit Date:  8/22/2020  8:45 PM    PCP:   Maricel Celestin MD  Code Status:  DNR-CCA    Subjective:     C/C:   Chief Complaint   Patient presents with    Dehydration     Interval History Status: not changed. Patient seen and examined at bedside, no acute events overnight. Continue to improve overall with better breathing. Patient denies any chest pain, shortness of breath, chills, fevers, nausea or vomiting. Patient vitals, labs and all providers notes were reviewed,from overnight shift and morning updates were noted and discussed with the nurse      Brief History:       Amos Rapp is a 80 y.o. Non-/non  male who presents with Dehydration   and is admitted to the hospital for the management of Dehydration.   Per my NP\Patient presented to the emergency room with a chief complaint of nausea and inability to eat. He has a history of prostate cancer and has been treated with chemotherapy which she recently ceased secondary to nausea and vomiting. He called a nursing service available to him who directed him to go to the emergency department based on his symptoms. Other medical history includes hypertension, hyperlipidemia, hypothyroidism, CKD, anemia, and status post TAVR. He sees Dr. Summer Garcia for urology, Dr. Radha Cavanaugh for nephrology, and Dr. Shanthi Abrams for cardiology. pertinent lab findings include hemoglobin 8.2, creatinine 2.34, BUN 32, GFR 26, alk phos 1172, troponin 118, TSH 8.42. The CT of the abdomen is suggestive of duodenitis and reveals moderate severe sigmoid colonic diverticulosis, along with scattered osteoblastic lesions involving the osseous structures. He is being admitted for further evaluation and management of dehydration.     Review of Systems:     Review of Systems   Constitutional: Positive for activity change, appetite change and fatigue. HENT: Positive for hearing loss and trouble swallowing. Respiratory: Positive for shortness of breath. Neurological: Positive for dizziness and weakness. Medications: Allergies: Allergies   Allergen Reactions    Ciprofloxacin      Other reaction(s): Unknown    Dilantin  [Phenytoin]      Other reaction(s): Unknown    Levaquin  [Levofloxacin In D5w]      Other reaction(s): Allergy: RASH;    Levofloxacin     Levofloxacin     Macrobid  [Nitrofurantoin Macrocrystal]      Other reaction(s): Unknown    Other      Unknown antibiotic    Sulfamethoxazole-Trimethoprim      Other reaction(s): Unknown    Tetracycline Hcl      Other reaction(s): Unknown    Tetracyclines & Related        Current Meds:   Scheduled Meds:    aspirin  81 mg Oral Daily    levothyroxine  100 mcg Oral Daily    pantoprazole  40 mg Oral BID AC    clopidogrel  75 mg Oral Daily    pravastatin  20 mg Oral Daily    sodium chloride flush  10 mL Intravenous 2 times per day    heparin (porcine)  5,000 Units Subcutaneous 3 times per day    [START ON 8/24/2020] amLODIPine  10 mg Oral Daily     Continuous Infusions:    dextrose 5% and 0.45% NaCl with KCl 20 mEq 100 mL/hr at 08/23/20 1040     PRN Meds: sodium chloride flush, acetaminophen **OR** acetaminophen, polyethylene glycol, promethazine **OR** ondansetron, nicotine, albuterol sulfate HFA    Data:     Past Medical History:   has a past medical history of Adenocarcinoma of prostate ([facility] FARHAN (acute kidney injury) ([facility] Arthritis, Chronic back pain, CRF (chronic renal failure), Hypertension, Renal insufficiency, and Thyroid disease. Social History:   reports that he has never smoked. He has never used smokeless tobacco. He reports that he does not drink alcohol or use drugs.      Family History:   Family History   Problem Relation Age of Onset    Cancer Father        Vitals:  BP (!) 167/72   Pulse 95   Temp 98.2 °F (36.8 °C) (Oral)   Resp 20   Ht 6' (1.829 m)   Wt 166 lb (75.3 kg)   SpO2 91%   BMI 22.51 kg/m²   Temp (24hrs), Av.2 °F (36.8 °C), Min:98.1 °F (36.7 °C), Max:98.6 °F (37 °C)    No results for input(s): POCGLU in the last 72 hours. I/O (24Hr): Intake/Output Summary (Last 24 hours) at 2020 1515  Last data filed at 2020 1245  Gross per 24 hour   Intake 1460 ml   Output 675 ml   Net 785 ml       Labs:  Hematology:  Recent Labs     20   WBC 7.8 7.7   RBC 2.65* 2.53*   HGB 8.2* 7.7*   HCT 26.2* 25.3*   MCV 98.9 100.0   MCH 30.9 30.4   MCHC 31.3 30.4   RDW 14.0 13.9    151   MPV 10.0 10.3   INR  --  1.0     Chemistry:  Recent Labs     20  1214     --  145*  --    K 3.9  --  3.7  --      --  110*  --    CO2 24  --  23  --    GLUCOSE 128*  --  94  --    BUN 32*  --  31*  --    CREATININE 2.34*  --  2.22*  --    MG 1.7  --   --   --    ANIONGAP 13  --  12  --    LABGLOM 26*  --  28*  --    GFRAA 32*  --  34*  --    CALCIUM 8.6  --  8.3*  --    PHOS 3.6  --   --   --    TROPHS 123* 118* 125* 130*   MYOGLOBIN 138* 133*  --   --      Recent Labs     20   PROT 5.8*   LABALBU 3.5   I2QRJYH 6.4   TSH 8.42*   AST 16   ALT 8   ALKPHOS 1,174*   BILITOT 0.40   BILIDIR 0.10   LIPASE 18     ABG:No results found for: POCPH, PHART, PH, POCPCO2, RBV9RJN, PCO2, POCPO2, PO2ART, PO2, POCHCO3, GRL2QJJ, HCO3, NBEA, PBEA, BEART, BE, THGBART, THB, WEJ3RRS, ZUDI9ZLJ, F9BJCSNI, O2SAT, FIO2  No results found for: SPECIAL  No results found for: CULTURE    Radiology:  Ct Abdomen Pelvis Wo Contrast    Result Date: 2020  Question subtle haziness of the fat along the distal duodenum this could suggest underlying duodenitis or could be due to third-spacing of fluid. Please correlate with exam findings. Moderate severe sigmoid colonic diverticulosis.  Scattered osteoblastic lesions involving the osseous structures. Cholelithiasis. Xr Shoulder Left (min 2 Views)    Result Date: 8/22/2020  Degenerate change. No evidence acute fracture dislocation     Xr Chest Portable    Result Date: 8/22/2020  No acute cardiopulmonary disease. Physical Examination:        Physical Exam  Vitals signs and nursing note reviewed. Constitutional:       General: He is not in acute distress. Appearance: He is ill-appearing. HENT:      Head: Normocephalic and atraumatic. Mouth/Throat:      Mouth: Mucous membranes are dry. Eyes:      Conjunctiva/sclera: Conjunctivae normal.      Pupils: Pupils are equal, round, and reactive to light. Cardiovascular:      Rate and Rhythm: Normal rate and regular rhythm. Heart sounds: No murmur. Pulmonary:      Effort: Pulmonary effort is normal. No accessory muscle usage or respiratory distress. Breath sounds: No stridor. Wheezing present. No decreased breath sounds, rhonchi or rales. Abdominal:      General: Bowel sounds are normal. There is no distension. Palpations: Abdomen is soft. Abdomen is not rigid. Tenderness: There is no abdominal tenderness. There is no guarding. Musculoskeletal:         General: No tenderness. Skin:     General: Skin is warm and dry. Findings: No erythema, lesion or rash. Neurological:      Mental Status: He is alert and oriented to person, place, and time. Cranial Nerves: No cranial nerve deficit. Motor: No seizure activity. Psychiatric:         Speech: Speech normal.         Behavior: Behavior normal. Behavior is cooperative.          Assessment:        Hospital Problems           Last Modified POA    * (Principal) Dehydration 8/23/2020 Yes    Essential hypertension 8/23/2020 Yes    High cholesterol 8/23/2020 Yes    Hypothyroid 8/23/2020 Yes    CKD stage 4 secondary to hypertension ([address] 8/23/2020 Yes    S/P TAVR (transcatheter aortic valve replacement) 8/23/2020 Yes    Anemia 8/23/2020 Yes    Moderate malnutrition (HCC) (Chronic) 8/23/2020 Yes    Adenocarcinoma of prostate ([facility] [facility] 8/23/2020 Yes          Plan:        Metastatic adenocarcinoma of prostate: Patient was actively receiving chemotherapy currently on hold as patient is not tolerating the treatment well, patient did not initiate radiotherapy as he is getting weaker. Appreciate oncology recommendation regarding prognosis    Dehydration secondary to poor intake: Gentle hydration. CKD 4: Monitor creatinine and avoid nephrotoxic agents    Status post TAVR    Elevated troponin: Denies any chest pain, trend troponin cardiology consulted    Hypothyroidism continue home medication.     HTN: uncontrolled, continue home medications    HPL: continue home medications    Hard of hearing    DVT prophylaxis    Discussed in detail with the patient wife through the phone, overall prognosis is guarded and I involved palliative care for goals of care and discussion of CODE STATUS      Discussed with the nurse as well      Romana Woo MD  8/23/2020  3:15 PM

## 2020-08-23 NOTE — PROGRESS NOTES
[facility] Hematology/Oncology Progress Note  8/23/2020 5:56 PM  Subjective:   Admit Date: 8/22/2020  PCP: Sairta Valdviia MD      Primary Oncologist: Dr. Reyna Wall  Pain: pain is perceived as moderate (4-6 pain scale)  Interval History: Patient is a 80y.o. year old man with castrate resistant metastatic prostate cancer followed by Dr. Reyna Wall. Patient has progressed on Lupron and Zytiga. Recent referred to radiation oncology for consideration of Xofigo or palliative radiation. Patient was not a candidate for Xofigo due to anemia. Patient decided that he did not want to proceed with palliative radiation because he knew that other areas would come up and he did not want a go down that road. Patient admitted with nausea and poor p.o. intake. He was seen by palliative care who changed his CODE STATUS to DNR CC    He is seen in his room with Kika Herrera RN present. Pain is currently well controlled. Currently is taking Tylenol only. There are couple areas on his lower back which the pain is still not well controlled. Denies fevers or chills.         Medications:   Scheduled Meds:   aspirin  81 mg Oral Daily    levothyroxine  100 mcg Oral Daily    pantoprazole  40 mg Oral BID AC    clopidogrel  75 mg Oral Daily    pravastatin  20 mg Oral Daily    sodium chloride flush  10 mL Intravenous 2 times per day    heparin (porcine)  5,000 Units Subcutaneous 3 times per day    [START ON 8/24/2020] amLODIPine  10 mg Oral Daily     Continuous Infusions:   dextrose 5% and 0.45% NaCl with KCl 20 mEq 100 mL/hr at 08/23/20 1040     CBC:   Recent Labs     08/22/20 2111 08/23/20  0619   WBC 7.8 7.7   HGB 8.2* 7.7*    151     BMP:    Recent Labs     08/22/20 2111 08/23/20  0619    145*   K 3.9 3.7    110*   CO2 24 23   BUN 32* 31*   CREATININE 2.34* 2.22*   GLUCOSE 128* 94     Hepatic:   Recent Labs     08/22/20 2111   AST 16   ALT 8   BILITOT 0.40   ALKPHOS 1,174*     INR:   Recent Labs 08/23/20  0619   INR 1.0       Objective:   Vitals: BP (!) 167/72   Pulse 95   Temp 98.2 °F (36.8 °C) (Oral)   Resp 20   Ht 6' (1.829 m)   Wt 166 lb (75.3 kg)   SpO2 91%   BMI 22.51 kg/m²   General appearance: alert and cooperative with exam  HEENT: Neck / Thyroid: no asymmetry, masses, or scars, supple without significant adenopathy, trachea midline   Neck: no adenopathy, no carotid bruit, no JVD, supple, symmetrical, trachea midline and thyroid not enlarged, symmetric, no tenderness/mass/nodules  Lungs: clear to auscultation bilaterally  Heart:  Regular rate and rhythm, Normal S1,S2, No S3, S4; No Gallop, No murmur; Abdomen: soft, non-tender; bowel sounds normal; no masses,  no organomegaly  Extremities: extremities normal, atraumatic, no cyanosis or edema  Neurologic: No focal Neurological deficits; cranial nerves in tact 2 to 12; Moving all extremeties  Psychiatric: Normal affect  Skin: No suspicious lesions or ecchymoses    Assessment and Plan:   1. Castrate resistant metastatic prostate cancer, now with progression on Lupron and Zytiga  -Was seen by radiation oncology. Not a candidate for Xofigo. Patient does not want to proceed with palliative radiation  -Patient is followed outpatient by the [address] [place] [address] and [address]  -I discussed my recommendation for hospice; I will contact since her and asked them to discuss further with patient and his wife  -Wife is not present, but per nursing, she is becoming overwhelmed with the care    2. Cancer related pain  -Prescribed Norco to be taken as needed    #3.   Disposition  -Okay to discharge from my view when okay with others    Patient Active Problem List:     Essential hypertension     High cholesterol     Hypothyroid     CRF (chronic renal failure)     Gastroenteritis     ARF (acute renal failure) (HCC)     Severe anemia     CKD stage 4 secondary to hypertension (HCC)     S/P TAVR (transcatheter aortic valve replacement)     Anemia     Weight loss     Other fatigue     Weakness     Duodenitis     Esophagitis     Large hiatal hernia     Acute kidney injury superimposed on chronic kidney disease (HCC)     Nausea vomiting and diarrhea     Diarrhea     Hiatus hernia syndrome     Moderate malnutrition (HCC)     Dehydration     Acute on chronic diastolic congestive heart failure, NYHA class 3 (HCC)     [facility]      Jer Jaime M.D.

## 2020-08-23 NOTE — PLAN OF CARE
Problem: Falls - Risk of:  Goal: Will remain free from falls  Description: Will remain free from falls  8/23/2020 1645 by Maira Castro RN  Outcome: Ongoing  Note: Siderails up x 2  Hourly rounding. Call light in reach. Instructed to call for assist before attempting out of bed. Remains free from falls and accidental injury at this time. Floor free from obstacles, and bed is locked and in lowest position. Adequate lighting provided. Bed alarm on. Fall sticker on wristband.  Fall Sign posted in doorway   8/23/2020 0301 by Shaila Lopez RN  Outcome: Ongoing     Problem: Falls - Risk of:  Goal: Absence of physical injury  Description: Absence of physical injury  8/23/2020 1645 by Maira Castro RN  Outcome: Ongoing  8/23/2020 0301 by Shaila Lopez RN  Outcome: Ongoing     Problem: Skin Integrity:  Goal: Will show no infection signs and symptoms  Description: Will show no infection signs and symptoms  8/23/2020 1645 by Maira Castro RN  Outcome: Ongoing  8/23/2020 0301 by Shaila Lopez RN  Outcome: Ongoing     Problem: Skin Integrity:  Goal: Absence of new skin breakdown  Description: Absence of new skin breakdown  8/23/2020 1645 by Maira Castro RN  Outcome: Ongoing  Note: Waffle mattress on bed and properly inflated   Q2 turn  Meiplex to coccyx    8/23/2020 0301 by Shaila Lopez RN  Outcome: Ongoing

## 2020-08-23 NOTE — FLOWSHEET NOTE
Patient states about his cancer. Shares frustration with his condition. 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 \"  These statements upset him. Patient shares about the loss of bruna when it comes to healing, Spiritually states a strong bruna. Patient requested communion.  shared in presence, listening, support, communion. Follow up as needed. 08/23/20 4446   Encounter Summary   Services provided to: Patient   Referral/Consult From: [facility]   Support System Spouse   Continue Visiting   (8-23-20)   Complexity of Encounter Moderate   Length of Encounter 30 minutes   Spiritual Assessment Completed Yes   Routine   Type Initial   Spiritual/Gnosticism   Type Spiritual support   Assessment Passive; Approachable;Calm; Anxious; Helplessness   Intervention Active listening;Explored feelings, thoughts, concerns;Prayer;Sustaining presence/ Ministry of presence; Discussed illness/injury and it's impact; Discussed belief system/Temple practices/bruna;Communion   Sacraments   Communion Patient received communion

## 2020-08-23 NOTE — ED PROVIDER NOTES
EMERGENCY DEPARTMENT ENCOUNTER   ATTENDING ATTESTATION     Pt Name: Avril Cook  MRN: [id number]  Armsijmigfkarina 5/23/1929  Date of evaluation: 8/22/20   Avril Cook is a 80 y.o. male with CC: Dehydration    MDM:   Patient is a 27-year-old male with history of prostate cancer recently taken off of chemotherapy due to nausea and intolerance here with persistent nausea generalized weakness fatigue and upset stomach. No fevers no cough no chest pain no headache no neck stiffness no falls or injuries, normal urination no dark or bloody stools. On exam he appears fatigued, nontoxic resting in bed afebrile speaking full sentences abdomen soft there is no pulsatile mass, no lower extremity edema. Will check labs, cardiac work-up, urine, chest x-ray, EKG, thyroid studies, hydrate Zofran plan for imaging of the abdomen after creatinine comes back to rule out any acute process         CRITICAL CARE:       EKG: All EKG's are interpreted by the Emergency Department Physician who either signs or Co-signs this chart in the absence of a cardiologist.    EKG Interpretation    Interpreted by me    Rhythm: normal sinus   Rate: normal  Axis: normal  Ectopy: none  Conduction: LVH  ST Segments: no acute change  T Waves: Biphasic in lead III  Q Waves: none    Clinical Impression: Nonspecific EKG    RADIOLOGY:All plain film, CT, MRI, and formal ultrasound images (except ED bedside ultrasound) are read by the radiologist, see reports below, unless otherwise noted in MDM or here. XR CHEST PORTABLE    (Results Pending)   XR SHOULDER LEFT (MIN 2 VIEWS)    (Results Pending)     LABS: All lab results were reviewed by myself, and all abnormals are listed below.   Labs Reviewed   CBC WITH AUTO DIFFERENTIAL - Abnormal; Notable for the following components:       Result Value    RBC 2.65 (*)     Hemoglobin 8.2 (*)     Hematocrit 26.2 (*)     Lymphocytes 21 (*)     Eosinophils % 5 (*)     Immature Granulocytes 2 (*)     All other components within normal limits   URINALYSIS   BASIC METABOLIC PANEL   LIPASE   HEPATIC FUNCTION PANEL   TROP/MYOGLOBIN   TROP/MYOGLOBIN   MAGNESIUM   PHOSPHORUS     CONSULTS:  None  FINAL IMPRESSION    No diagnosis found. PASTMEDICAL HISTORY     Past Medical History:   Diagnosis Date    Adenocarcinoma of prostate ([facility] 7-28-14    Dr. Bri REAL (acute kidney injury) ([facility] 10/04/2019    Arthritis     Chronic back pain     CRF (chronic renal failure)     Hypertension     Renal insufficiency     stage 4    Thyroid disease      SURGICAL HISTORY       Past Surgical History:   Procedure Laterality Date    BACK SURGERY      HERNIA REPAIR      JOINT REPLACEMENT Bilateral knees    SHOULDER SURGERY Right     THYROIDECTOMY      ULNAR TUNNEL RELEASE      UPPER GASTROINTESTINAL ENDOSCOPY  7/11/14    UPPER GASTROINTESTINAL ENDOSCOPY N/A 9/19/2019    EGD BIOPSY performed by Millie Burdick MD at [facility]       Previous Medications    AMLODIPINE (NORVASC) 2.5 MG TABLET    Take 1 tablet by mouth daily    ASPIRIN 81 MG TABLET    Take 81 mg by mouth daily. CLOPIDOGREL (PLAVIX) 75 MG TABLET    TAKE 1 TABLET DAILY RESUME IN 3 TO 4 DAYS AS DIRECTED    LEUPROLIDE ACETATE (ELIGARD SC)    Inject into the skin Twice yearly at MD office    LEVOTHYROXINE (SYNTHROID) 100 MCG TABLET    TAKE 1 TABLET DAILY    LISINOPRIL (PRINIVIL;ZESTRIL) 20 MG TABLET    Take 20 mg by mouth daily. MISC. DEVICES (WHEELCHAIR) MISC    1 manual wheelchair    ONDANSETRON (ZOFRAN) 4 MG TABLET    Take 1 tablet by mouth 3 times daily as needed for Nausea or Vomiting    PANTOPRAZOLE (PROTONIX) 40 MG TABLET    TAKE 1 TABLET TWICE A DAY BEFORE MEALS    PRAVASTATIN (PRAVACHOL) 20 MG TABLET    TAKE 1 TABLET DAILY    SENNA (SENOKOT) 8.6 MG TABLET    Take 1 tablet by mouth 2 times daily as needed for Constipation    TAMSULOSIN (FLOMAX) 0.4 MG CAPSULE    Take 1 capsule by mouth daily.     TIZANIDINE (ZANAFLEX) 2 MG TABLET    Take 2 mg by mouth

## 2020-08-23 NOTE — ED PROVIDER NOTES
[address] [place] [address] ED  EMERGENCY DEPARTMENT ENCOUNTER      Pt Name: Reed Nolan  MRN: [id number]  Armsjimigfurt 5/23/1929  Date of evaluation: 8/22/20  CHIEF COMPLAINT       Chief Complaint   Patient presents with    Dehydration     HISTORY OF PRESENT ILLNESS   Presents the emergency room with concern for dehydration. He has been dealing with nausea and vomiting for several weeks. Family at the bedside states that he was started on oral chemotherapy for prostate cancer. He was on that medication for approximately 3 weeks which was stopped with the doctors permission because of nausea and vomiting. He has been off of that medication for 2 weeks but continues to feel nauseous and is eating very little. He denies abdominal pain. He is complaining of left shoulder pain from a fall last week. No chest pain or shortness of breath. He did not have any head injury with that fall. The history is provided by a relative. REVIEW OF SYSTEMS     Review of Systems   Constitutional: Positive for activity change and fatigue. Negative for fever. HENT: Negative for congestion and sore throat. \"bad taste in mouth\"   Eyes: Negative for photophobia and visual disturbance. Respiratory: Negative for cough and shortness of breath. Cardiovascular: Negative for chest pain and palpitations. Gastrointestinal: Positive for nausea and vomiting. Negative for abdominal pain, constipation and diarrhea. Musculoskeletal: Positive for arthralgias. Negative for back pain and neck pain. Skin: Negative for color change and wound. Neurological: Positive for light-headedness. Negative for syncope, speech difficulty, weakness and headaches.      PASTMEDICAL HISTORY     Past Medical History:   Diagnosis Date    Adenocarcinoma of prostate ([facility] 7-28-14    Dr. Carisa Manjarrez    FARHAN (acute kidney injury) ([facility] 10/04/2019    Arthritis     Chronic back pain     CRF (chronic renal failure)     Hypertension     Renal insufficiency stage 4    Thyroid disease      SURGICAL HISTORY       Past Surgical History:   Procedure Laterality Date    BACK SURGERY      HERNIA REPAIR      JOINT REPLACEMENT Bilateral knees    SHOULDER SURGERY Right     THYROIDECTOMY      ULNAR TUNNEL RELEASE      UPPER GASTROINTESTINAL ENDOSCOPY  14    UPPER GASTROINTESTINAL ENDOSCOPY N/A 2019    EGD BIOPSY performed by Barry Licea MD at [facility]       Previous Medications    AMLODIPINE (NORVASC) 2.5 MG TABLET    Take 1 tablet by mouth daily    ASPIRIN 81 MG TABLET    Take 81 mg by mouth daily. CLOPIDOGREL (PLAVIX) 75 MG TABLET    TAKE 1 TABLET DAILY RESUME IN 3 TO 4 DAYS AS DIRECTED    LEUPROLIDE ACETATE (ELIGARD SC)    Inject into the skin Twice yearly at MD office    LEVOTHYROXINE (SYNTHROID) 100 MCG TABLET    TAKE 1 TABLET DAILY    LISINOPRIL (PRINIVIL;ZESTRIL) 20 MG TABLET    Take 20 mg by mouth daily. MISC. DEVICES (WHEELCHAIR) MISC    1 manual wheelchair    ONDANSETRON (ZOFRAN) 4 MG TABLET    Take 1 tablet by mouth 3 times daily as needed for Nausea or Vomiting    PANTOPRAZOLE (PROTONIX) 40 MG TABLET    TAKE 1 TABLET TWICE A DAY BEFORE MEALS    PRAVASTATIN (PRAVACHOL) 20 MG TABLET    TAKE 1 TABLET DAILY    SENNA (SENOKOT) 8.6 MG TABLET    Take 1 tablet by mouth 2 times daily as needed for Constipation    TAMSULOSIN (FLOMAX) 0.4 MG CAPSULE    Take 1 capsule by mouth daily. TIZANIDINE (ZANAFLEX) 2 MG TABLET    Take 2 mg by mouth every 6 hours as needed (muscle spasm)     TRAMADOL (ULTRAM) 50 MG TABLET    Take 1 tablet by mouth every 8 hours as needed for Pain. ALLERGIES     is allergic to ciprofloxacin; dilantin  [phenytoin]; levaquin  [levofloxacin in d5w]; levofloxacin; levofloxacin; macrobid  [nitrofurantoin macrocrystal]; other; sulfamethoxazole-trimethoprim; tetracycline hcl; and tetracyclines & related. FAMILY HISTORY     He indicated that his mother is .  He indicated that his father is . SOCIAL HISTORY       Social History     Tobacco Use    Smoking status: Never Smoker    Smokeless tobacco: Never Used   Substance Use Topics    Alcohol use: No    Drug use: No     PHYSICAL EXAM     INITIAL VITALS: BP (!) 167/67   Pulse 81   Temp 98.6 °F (37 °C)   Resp 16   SpO2 94%    Physical Exam  Constitutional:       Appearance: Normal appearance. HENT:      Right Ear: External ear normal.      Left Ear: External ear normal.      Mouth/Throat:      Mouth: Mucous membranes are dry. Pharynx: Oropharynx is clear. Eyes:      General: No scleral icterus. Conjunctiva/sclera: Conjunctivae normal.   Neck:      Musculoskeletal: Normal range of motion. No muscular tenderness. Cardiovascular:      Rate and Rhythm: Normal rate and regular rhythm. Pulses: Normal pulses. Pulmonary:      Effort: Pulmonary effort is normal.      Breath sounds: Normal breath sounds. Abdominal:      General: Bowel sounds are normal.      Palpations: Abdomen is soft. Tenderness: There is no abdominal tenderness. Musculoskeletal: Normal range of motion. General: Tenderness present. Arms:       Right lower leg: No edema. Left lower leg: No edema. Skin:     General: Skin is warm and dry. Neurological:      General: No focal deficit present. Mental Status: He is alert and oriented to person, place, and time. Sensory: No sensory deficit. Psychiatric:         Mood and Affect: Mood normal.         Thought Content: Thought content normal.         DIAGNOSTIC RESULTS     RADIOLOGY:All plain film, CT, MRI, and formal ultrasound images (except ED bedside ultrasound) are read by the radiologist, see reports below, unless otherwise noted in MDM or here.     Interpretation per the Radiologist below, if available at the time of this note:    CT ABDOMEN PELVIS WO CONTRAST   Final Result   Question subtle haziness of the fat along the distal duodenum this could   suggest underlying duodenitis or could be due to third-spacing of fluid. Please correlate with exam findings. Moderate severe sigmoid colonic diverticulosis. Scattered osteoblastic lesions involving the osseous structures. Cholelithiasis. XR SHOULDER LEFT (MIN 2 VIEWS)   Final Result   Degenerate change. No evidence acute fracture dislocation         XR CHEST PORTABLE   Final Result   No acute cardiopulmonary disease.              LABS:  Labs Reviewed   URINALYSIS - Abnormal; Notable for the following components:       Result Value    Urine Hgb TRACE (*)     Protein, UA 2+ (*)     All other components within normal limits   CBC WITH AUTO DIFFERENTIAL - Abnormal; Notable for the following components:    RBC 2.65 (*)     Hemoglobin 8.2 (*)     Hematocrit 26.2 (*)     Lymphocytes 21 (*)     Eosinophils % 5 (*)     Immature Granulocytes 2 (*)     All other components within normal limits   BASIC METABOLIC PANEL - Abnormal; Notable for the following components:    Glucose 128 (*)     BUN 32 (*)     CREATININE 2.34 (*)     GFR Non- 26 (*)     GFR  32 (*)     All other components within normal limits   HEPATIC FUNCTION PANEL - Abnormal; Notable for the following components:    Alkaline Phosphatase 1,174 (*)     Total Protein 5.8 (*)     All other components within normal limits   TROP/MYOGLOBIN - Abnormal; Notable for the following components:    Troponin, High Sensitivity 123 (*)     Myoglobin 138 (*)     All other components within normal limits   TROP/MYOGLOBIN - Abnormal; Notable for the following components:    Troponin, High Sensitivity 118 (*)     Myoglobin 133 (*)     All other components within normal limits   TSH WITHOUT REFLEX - Abnormal; Notable for the following components:    TSH 8.42 (*)     All other components within normal limits   MICROSCOPIC URINALYSIS - Abnormal; Notable for the following components:    Mucus, UA 1+ (*)     All other components within normal limits   LIPASE   MAGNESIUM   PHOSPHORUS   T4       All other labs were within normal range or not returned as of this dictation. EMERGENCY DEPARTMENT COURSE and DIFFERENTIAL DIAGNOSIS/MDM:   Vitals:    Vitals:    20 2040 20 2145 20 2158 20 2303   BP: (!) 167/68 (!) 173/68 (!) 179/70 (!) 167/67   Pulse: 94 80 79 81   Resp: 20 16 15 16   Temp: 98.6 °F (37 °C)      SpO2: 94% 95% 95% 94%       Medical Decision Makin-year-old male with continued nausea and vomiting. Clinically he appears dehydrated. He has elevated kidney function which is close to his baseline. He also has elevated troponins likely from the chronic kidney disease. CT of the abdomen had to be performed without contrast because of the kidney function and does have some abnormalities as above. He received IV hydration and antiemetics. He will be admitted for further evaluation and treatment. CONSULTS:  IP CONSULT TO INTERNAL MEDICINE  IP CONSULT TO CARDIOLOGY  IP CONSULT TO ONCOLOGY    PROCEDURES:  None    The patient was given the following meds in the ED:  Orders Placed This Encounter   Medications    0.9 % sodium chloride bolus    ondansetron (ZOFRAN) injection 4 mg    aspirin chewable tablet 324 mg       FINAL IMPRESSION      1. Dehydration    2. Elevated troponin    3.  Abnormal CT of the abdomen          DISPOSITION/PLAN   DISPOSITION Admitted 2020 11:51:17 PM      PATIENT REFERRED TO:   Margarito Boland MD  [address] [place] [address] MD [address]  [address] [facility],[address]  [facility] Kamryn Barber  [address] [place] [address]            DISCHARGE MEDICATIONS:     New Prescriptions    No medications on file       CRITICAL CARE TIME       Please note that portions of this note were completed with a voice recognition program.      [place], [place], APRN - CNP  20 [id number]

## 2020-08-23 NOTE — CONSULTS
(PRAVACHOL) 20 MG tablet TAKE 1 TABLET DAILY 8/10/20   Pastora Grant MD   levothyroxine (SYNTHROID) 100 MCG tablet TAKE 1 TABLET DAILY 7/22/20   Pastora Grant MD   clopidogrel (PLAVIX) 75 MG tablet TAKE 1 TABLET DAILY RESUME IN 3 TO 4 DAYS AS DIRECTED 5/5/20   Pastora Grant MD   pantoprazole (PROTONIX) 40 MG tablet TAKE 1 TABLET TWICE A DAY BEFORE MEALS 4/1/20   Pastora Grant MD   [facility] 1 manual wheelchair 12/31/19   Pastora Grant MD   senna (SENOKOT) 8.6 MG tablet Take 1 tablet by mouth 2 times daily as needed for Constipation 10/5/19 10/4/20  Pastora Grant MD   Leuprolide Acetate (ELIGARD SC) Inject into the skin Twice yearly at MD office    Historical Provider, MD   traMADol (ULTRAM) 50 MG tablet Take 1 tablet by mouth every 8 hours as needed for Pain. 4/27/18   Historical Provider, MD   tiZANidine (ZANAFLEX) 2 MG tablet Take 2 mg by mouth every 6 hours as needed (muscle spasm)     Historical Provider, MD   amLODIPine (NORVASC) 2.5 MG tablet Take 1 tablet by mouth daily 10/5/15   Historical Provider, MD   tamsulosin (FLOMAX) 0.4 MG capsule Take 1 capsule by mouth daily. 2/19/15   Historical Provider, MD   lisinopril (PRINIVIL;ZESTRIL) 20 MG tablet Take 20 mg by mouth daily. Historical Provider, MD   aspirin 81 MG tablet Take 81 mg by mouth daily.     Historical Provider, MD     Current Medications:    Current Facility-Administered Medications   Medication Dose Route Frequency Provider Last Rate Last Dose    amLODIPine (NORVASC) tablet 2.5 mg  2.5 mg Oral Daily Jerryl Campi, APRN - CNP        aspirin EC tablet 81 mg  81 mg Oral Daily Jerryl Campi, APRN - CNP        levothyroxine (SYNTHROID) tablet 100 mcg  100 mcg Oral Daily Jerryl Campi, APRN - CNP   100 mcg at 08/23/20 5380    pantoprazole (PROTONIX) tablet 40 mg  40 mg Oral BID AC Jerryl Campi, APRN - CNP   40 mg at 08/23/20 7519    clopidogrel (PLAVIX) tablet 75 mg  75 mg Oral Daily Vernia Emms, APRN - CNP        pravastatin (PRAVACHOL) tablet 20 mg  20 mg Oral Daily Vernia Saadias, APRN - CNP        sodium chloride flush 0.9 % injection 10 mL  10 mL Intravenous 2 times per day Verrhea Zees, APRN - CNP        sodium chloride flush 0.9 % injection 10 mL  10 mL Intravenous PRN Vernia Emms, APRN - CNP        acetaminophen (TYLENOL) tablet 650 mg  650 mg Oral Q6H PRN Vernia Emms, APRN - CNP   650 mg at 08/23/20 5781    Or    acetaminophen (TYLENOL) suppository 650 mg  650 mg Rectal Q6H PRN Vernia Emms, APRN - CNP        polyethylene glycol (GLYCOLAX) packet 17 g  17 g Oral Daily PRN Verrhea Zees, APRN - CNP        promethazine (PHENERGAN) tablet 12.5 mg  12.5 mg Oral Q6H PRN Vernia Emms, APRN - CNP   12.5 mg at 08/23/20 3281    Or    ondansetron (ZOFRAN) injection 4 mg  4 mg Intravenous Q6H PRN Verrhea Emms, APRN - CNP   4 mg at 08/23/20 1873    nicotine (NICODERM CQ) 21 MG/24HR 1 patch  1 patch Transdermal Daily PRN Verrhea Zees, APRN - CNP        0.9 % sodium chloride infusion   Intravenous Continuous Roxi Zees, APRN - CNP 75 mL/hr at 08/23/20 0203      heparin (porcine) injection 5,000 Units  5,000 Units Subcutaneous 3 times per day Juan Carlos Akhtar APRN - CNP   5,000 Units at 08/23/20 0607     Allergies:  Ciprofloxacin; Dilantin  [phenytoin]; Levaquin  [levofloxacin in d5w]; Levofloxacin; Levofloxacin; Macrobid  [nitrofurantoin macrocrystal];  Other; Sulfamethoxazole-trimethoprim; Tetracycline hcl; and Tetracyclines & related    Social History:    Social History     Socioeconomic History    Marital status:      Spouse name: Not on file    Number of children: Not on file    Years of education: Not on file    Highest education level: Not on file   Occupational History    Not on file   Social Needs    Financial resource strain: Not on file    Food insecurity     Worry: Not on file Inability: Not on file    Transportation needs     Medical: Not on file     Non-medical: Not on file   Tobacco Use    Smoking status: Never Smoker    Smokeless tobacco: Never Used   Substance and Sexual Activity    Alcohol use: No    Drug use: No    Sexual activity: Not Currently     Partners: Female   Lifestyle    Physical activity     Days per week: Not on file     Minutes per session: Not on file    Stress: Not on file   Relationships    Social connections     Talks on phone: Not on file     Gets together: Not on file     Attends [employer] service: Not on file     Active member of club or organization: Not on file     Attends meetings of clubs or organizations: Not on file     Relationship status: Not on file    Intimate partner violence     Fear of current or ex partner: Not on file     Emotionally abused: Not on file     Physically abused: Not on file     Forced sexual activity: Not on file   Other Topics Concern    Not on file   Social History Narrative    Not on file     Family History:   Family History   Problem Relation Age of Onset    Cancer Father        · REVIEW OF SYSTEMS   CONSTITUTIONAL: negative except for  fatigue  EYES:  negative  HEENT:  negative except for  hearing loss  RESPIRATORY: negative   CARDIOVASCULAR:  negative except for occasional dizziness with position change  GASTROINTESTINAL:  negative except for nausea and vomiting  GENITOURINARY:  negative except for prostate cancer  INTEGUMENT:  negative  HEMATOLOGIC/LYMPHATIC:  negative except for history of anemia  ALLERGIC/IMMUNOLOGIC:  negative except for drug reactions  ENDOCRINE:  negative  MUSCULOSKELETAL:  negative  NEUROLOGICAL:  negative except for occasional dizziness, no history of CVA  BEHAVIOR/PSYCH:  negative except for anxiety, intermittent    PHYSICAL EXAM:    Vitals:    VITALS:  BP (!) 166/68   Pulse 86   Temp 98.2 °F (36.8 °C) (Oral)   Resp 20   Ht 6' (1.829 m)   Wt 166 lb (75.3 kg)   SpO2 94%   BMI 22.51 kg/m²   24HR INTAKE/OUTPUT:      Intake/Output Summary (Last 24 hours) at 8/23/2020 0855  Last data filed at 8/23/2020 3518  Gross per 24 hour   Intake 1460 ml   Output 525 ml   Net 935 ml       CONSTITUTIONAL:  awake, alert, cooperative, no apparent distress, and appears stated age  EYES: Sclera clear, conjunctiva normal  ENT:  normocepalic, without obvious abnormality  NECK:  supple, symmetrical, trachea midline, no carotid bruit, no JVD  BACK:  symmetric  LUNGS: Decreased in bases otherwise clear, nonlabored  CARDIOVASCULAR:  Regular rate and rhythm, normal S1 and S2, no S3 or S4, and 2/6 early systolic murmur at the aortic area and left lower sternal border dorsalis pedis, posterior tibial and bilateral palpable  ABDOMEN:  Normal bowel sounds, soft, non-distended, non-tender  MUSCULOSKELETAL:  there is no redness, warmth, or swelling of the joints  Mild bilateral lower extremity edema  NEUROLOGIC:  Awake, alert, oriented to name, place and time.   SKIN:  no bruising or bleeding, normal skin color, texture, turgor and no jaundice    DATA:   ECG: Sinus rhythm with possible left atrial abnormality and possible LVH otherwise normal EKG    ECHO:  Cardiology Labs:  Recent Labs     08/22/20  2111 08/22/20  2245 08/23/20  0619   MYOGLOBIN 138* 133*  --    TROPHS 123* 118* 125*   TROPONINT NOT REPORTED NOT REPORTED NOT REPORTED     Warfarin PT/INR:  Lab Results   Component Value Date    PROTIME 13.2 08/23/2020    INR 1.0 08/23/2020     CBC:  Lab Results   Component Value Date    WBC 7.7 08/23/2020    RBC 2.53 08/23/2020    HGB 7.7 08/23/2020    HCT 25.3 08/23/2020    .0 08/23/2020    MCH 30.4 08/23/2020    MCHC 30.4 08/23/2020    RDW 13.9 08/23/2020     08/23/2020    MPV 10.3 08/23/2020     CMP:  Lab Results   Component Value Date     08/23/2020    K 3.7 08/23/2020     08/23/2020    CO2 23 08/23/2020    BUN 31 08/23/2020    CREATININE 2.22 08/23/2020    GFRAA 34 08/23/2020    LABGLOM 28

## 2020-08-23 NOTE — CONSULTS
Palliative Care Inpatient Consult    NAME:  Maia Laguerre  MEDICAL RECORD NUMBER:  [id number]  AGE: 80 y.o. GENDER: male  : 1929  TODAY'S DATE:  2020    Reasons for Consultation:    Symptom and/or pain management  Provision of information regarding PC and/or hospice philosophies  Complex, time-intensive communication and interdisciplinary psychosocial support  Clarification of goals of care and/or assistance with difficult decision-making  Guidance in regards to resources and transition(s)    Members of PC team contributing to this consultation are :  Mohsen Strauss CNP Palliative Care   History of Present Illness     The patient is a 80 y.o. Non-/non  male who presents with Dehydration      Referred to Palliative Care by   [x] Physician   [] Nursing  [] Family Request   [] Other:       He was admitted to the Primary service for Dehydration [E86.0]. His hospital course has been associated with Dehydration. The patient has a complicated medical history and has been hospitalized since 2020  8:45 PM.  Kayleigh Wright is a 58-year-old male that was brought in to [facility] emergency room on  for dehydration. Patient is being treated for adenocarcinoma of the prostate and was taking oral chemotherapy until recently. The oral chemotherapy was discontinued due to patient's complaints of nausea, vomiting, decreased appetite, dizziness, and generalized weakness. Patient has a medical history of hypothyroidism renal insufficiency hypertension, hyperlipidemia, back pain, arthritis, TAVR. Patient's labs included hemoglobin 8.2, hematocrit 26.2, creatinine 2.34, troponin 118, TSH 8.42. His imaging includes:    CT Abdomen and Pelvis     Question subtle haziness of the fat along the distal duodenum this could    suggest underlying duodenitis or could be due to third-spacing of fluid.     Please correlate with exam findings.         Moderate severe sigmoid colonic diverticulosis.         Scattered Unknown    Dilantin  [Phenytoin]      Other reaction(s): Unknown    Levaquin  [Levofloxacin In D5w]      Other reaction(s): Allergy: RASH;    Levofloxacin     Levofloxacin     Macrobid  [Nitrofurantoin Macrocrystal]      Other reaction(s): Unknown    Other      Unknown antibiotic    Sulfamethoxazole-Trimethoprim      Other reaction(s): Unknown    Tetracycline Hcl      Other reaction(s): Unknown    Tetracyclines & Related          MEDICATIONS  Current Medications    aspirin  81 mg Oral Daily    levothyroxine  100 mcg Oral Daily    pantoprazole  40 mg Oral BID AC    clopidogrel  75 mg Oral Daily    pravastatin  20 mg Oral Daily    sodium chloride flush  10 mL Intravenous 2 times per day    heparin (porcine)  5,000 Units Subcutaneous 3 times per day    [START ON 8/24/2020] amLODIPine  10 mg Oral Daily     sodium chloride flush, acetaminophen **OR** acetaminophen, polyethylene glycol, promethazine **OR** ondansetron, nicotine, albuterol sulfate HFA  IV Drips/Infusions   dextrose 5% and 0.45% NaCl with KCl 20 mEq 100 mL/hr at 08/23/20 1040     Home Medications  No current facility-administered medications on file prior to encounter. Current Outpatient Medications on File Prior to Encounter   Medication Sig Dispense Refill    ondansetron (ZOFRAN) 4 MG tablet Take 1 tablet by mouth 3 times daily as needed for Nausea or Vomiting 10 tablet 0    pravastatin (PRAVACHOL) 20 MG tablet TAKE 1 TABLET DAILY 90 tablet 3    levothyroxine (SYNTHROID) 100 MCG tablet TAKE 1 TABLET DAILY 90 tablet 1    clopidogrel (PLAVIX) 75 MG tablet TAKE 1 TABLET DAILY RESUME IN 3 TO 4 DAYS AS DIRECTED 90 tablet 0    pantoprazole (PROTONIX) 40 MG tablet TAKE 1 TABLET TWICE A DAY BEFORE MEALS 180 tablet 1    Misc.  Devices [facility] 1 manual wheelchair 1 each 0    senna (SENOKOT) 8.6 MG tablet Take 1 tablet by mouth 2 times daily as needed for Constipation 60 tablet 0    Leuprolide Acetate (ELIGARD SC) Inject into the skin Twice yearly at MD office      traMADol (ULTRAM) 50 MG tablet Take 1 tablet by mouth every 8 hours as needed for Pain.  tiZANidine (ZANAFLEX) 2 MG tablet Take 2 mg by mouth every 6 hours as needed (muscle spasm)       amLODIPine (NORVASC) 2.5 MG tablet Take 1 tablet by mouth daily      tamsulosin (FLOMAX) 0.4 MG capsule Take 1 capsule by mouth daily.  lisinopril (PRINIVIL;ZESTRIL) 20 MG tablet Take 20 mg by mouth daily.  aspirin 81 MG tablet Take 81 mg by mouth daily. Data         BP (!) 159/67   Pulse 86   Temp 98.1 °F (36.7 °C) (Oral)   Resp 20   Ht 6' (1.829 m)   Wt 166 lb (75.3 kg)   SpO2 93%   BMI 22.51 kg/m²     Wt Readings from Last 3 Encounters:   08/23/20 166 lb (75.3 kg)   06/23/20 161 lb (73 kg)   12/31/19 162 lb (73.5 kg)        Code Status: DNR-CCA     ADVANCED CARE PLANNING:  Patient has capacity for medical decisions: yes  Health Care Power of : yes  Living Will: yes     Personal, Social, and Family History  Marital Status:   Living situation:with family:  spouse  Importance of bruna/Congregation/spiritual beliefs: [] Very [] Somewhat [] Not   Psychological Distress: mild  Does patient understand diagnosis/treatment? yes  Does caregiver understand diagnosis/treatment? yes      Assessment        REVIEW OF SYSTEMS  Constitutional: no fever, no chills, decreased appetite, fatigue, generalized weakness and dizziness   Eyes: no eye pain or blurred vision  ENT:  Patient with [place]   Respiratory: no wheezing, chest tightness, or shortness of breath   Cardiovascular: no chest pain or pressure, no palpitations, no diaphoresis   Gastrointestinal: Nausea, vomiting, decreased appetite  Genitourinary: no dysuria, frequency,hematuria , or nocturia   Musculoskeletal: generalized weakness and right flank pain   Skin:scattered ecchymosis noted   Neurological: Generalized weakness, follows commands, drowsy.     PHYSICAL ASSESSMENT:  Constitutional: Alert and oriented to person, place, and time. Generalized weakness   Head: Normocephalic and atraumatic. Eyes: EOM are normal. Pupils are equal, round   Neck: Normal range of motion. Neck supple. No tracheal deviation present. Cardiovascular: Normal rate and regular rhythm, S1, S2, no murmur   Pulmonary/Chest: Lungs diminished respirations relaxed . Abdomen: Soft. Right flank pain nausea and vomiting BSX4 decreased appetite  Musculoskeletal:generalized weakness    Neurological: alert and oriented, follow commands, drowsiness, dizziness  Skin: Ecchymosis noted on arms      Palliative Performance Scale:  ___60%  Ambulation reduced; Significant disease; Can't do hobbies/housework; intake normal or reduced; occasional assist; LOC full/confusion  ___50%  Mainly sit/lie; Extensive disease; Can't do any work; Considerable assist; intake normal or reduced; LOC full/confusion  _x__40%  Mainly in bed; Extensive disease; Mainly assist; intake normal or reduced; LOC full/confusion   ___30%  Bed Bound; Extensive disease; Total care; intake reduced; LOCfull/confusion  ___20%  Bed Bound; Extensive disease; Total care; intake minimal; Drowsy/coma  ___10%  Bed Bound; Extensive disease; Total care; Mouth care only; Drowsy/coma  ___0       Death      Plan      Palliative Interaction:  Patient wife Jenn Monson at bedside. Introduced myself and my palliative care role to them. Palliative care was consulted for Goals of care, symptom management, and code status discussion. DPOA and Living Will   Patient is  to Jenn Monson and has 1 biological son Jayesh Greer that lives with him. Jenn Monson patient's wife states she has a D POA and living will at home. I requested that she bring in the paperwork on her next visit. She stated that she would bring in the D POA and living will tomorrow. CODE STATUS  Patient is currently a full CODE STATUS. I discussed the different code levels and explained each 1 fully to Ondorie Floressolomon and his wife Jenn Monson.   Nohemy Tran and Renu Banegas both agreed to change patient's CODE STATUS to a DNR CCA with no intubation. Paperwork completed and signatures witnessed by Sagrario Adrian patient's RN. Orders placed in computer. Estela Hernández is now a DNR CC arrest without intubation. Goals of care  Patient lives at home with his wife and son. Prior to admission patient was utilizing [id number] [facility] and [facility]. Patient's wife Renu Banegas states she plans on taking Estela Hernández home and continuing the above services. Palliative care will continue to follow. Education/support to family  Education/support to patient  Discharge planning/helping to coordinate care  Communications with primary service  Pharmacologic pain management  Providing support for coping/adaptation/distress of family  Providing support for coping/adaptation/distress of patient  Discussing meaning/purpose   Caregiver support/education  Continue with current plan of care  Code status clarified: Full Code  Code status clarified: [facility]  Code status clarified: DNRCCA  Principle Problem/Diagnosis:  Dehydration    Additional Assessments:   Principal Problem:    Dehydration  Active Problems:    Essential hypertension    High cholesterol    Hypothyroid    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 *    1- Symptom management/ pain control     Pain Assessment:  Pain is controlled with current analgesics. Medication(s) being used: acetaminophen.                Anxiety:  none                          Dyspnea:  none                          Fatigue:  generalized weakness     Other:      2- Goals of care evaluation   The patient goals of care are improve or maintain function/quality of life   Goals of care discussed with:    [] Patient independently    [x] Patient and Family    [] Family or [place] independently    [] Unable to discuss with patient, family/DPOA not present    3- Code Status  DNR-CCA    4- Other recommendations   - We will continue to provide comfort and support to the patient and the family  Palliative Care will continue to follow Mr. Jamie anders as needed. Thank you for allowing Palliative Care to participate in the care of Mr. Kenji Fleming . This note has been dictated by dragon, typing errors may be a possibility. The total time I spent in seeing the patient, discussing goals of care, advanced directives, code status and other major issues was more than 60 minutes      Electronically signed by   MILKA Sweet NP  Palliative Care Team  on 8/23/2020 at 12:15 PM    Please call with any palliative questions or concerns. Palliative Care Team is available via perfect serve or via phone.

## 2020-08-23 NOTE — PROGRESS NOTES
Pt admitted to room 1002 per cart  in stable condition from ED. Oriented to room and surroundings  Bed in lowest position, wheels locked, 2/4 side rails up  Call light in reach, room free of clutter, adequate lighting provided  Denies any further questions at this time  Instructed to call out with any questions/concerns/new onset of pain and/or n/v   White board updated  Continue to monitor with hourly rounding  STAY WITH ME protocol initiated   Bed alarm on/Fall Risk signs in place/Fall risk sticker to wrist band  Non-skid socks on/at bedside  [address] in place for patient/family to view & ask questions.

## 2020-08-23 NOTE — PROGRESS NOTES
The potassium/magnesium sliding scale has been automatically discontinued per [facility] approved policy because the patient has decreased renal function (CrCl<30 ml/min). The patient's current K/Mag levels are currently:    Recent Labs     08/22/20 2111   K 3.9   MG 1.7       Estimated Creatinine Clearance: 22 mL/min (A) (based on SCr of 2.34 mg/dL (H)). For patients with decreased renal function (below 30ml/min) needing potassium/magnesium supplementation, please order individual bolus doses with appropriate monitoring. Please contact the inpatient pharmacy at [phone] with any concerns. Thank you.     JOSÉ MIGUEL ANTUNEZ  8/23/2020  2:32 AM

## 2020-08-23 NOTE — H&P
[facility]    HISTORY AND PHYSICAL EXAMINATION            Date:   8/23/2020  Patient name:  Raysa Phelps  Date of admission:  8/22/2020  8:45 PM  MRN:   [id number]  Account:  [de-identified]  YOB: 1929  PCP:    Elena Mcgowan MD  Room:   1002/1002-02  Code Status:    Full Code    Chief Complaint:     Chief Complaint   Patient presents with    Dehydration       History Obtained From:     patient, electronic medical record    History of Present Illness:     Raysa Phelps is a 80 y.o. Non-/non  male who presents with Dehydration   and is admitted to the hospital for the management of Dehydration. Patient presented to the emergency room with a chief complaint of nausea and inability to eat. He has a history of prostate cancer and has been treated with chemotherapy which she recently ceased secondary to nausea and vomiting. He called a nursing service available to him who directed him to go to the emergency department based on his symptoms. Other medical history includes hypertension, hyperlipidemia, hypothyroidism, CKD, anemia, and status post TAVR. He sees Dr. Mohsen Martin for urology, Dr. Abimbola Marcus for nephrology, and Dr. Radha Bustillos for cardiology. pertinent lab findings include hemoglobin 8.2, creatinine 2.34, BUN 32, GFR 26, alk phos 1172, troponin 118, TSH 8.42. The CT of the abdomen is suggestive of duodenitis and reveals moderate severe sigmoid colonic diverticulosis, along with scattered osteoblastic lesions involving the osseous structures. He is being admitted for further evaluation and management of dehydration.     Past Medical History:     Past Medical History:   Diagnosis Date    Adenocarcinoma of prostate ([facility] 7-28-14    Dr. Alvarez Sorensen    FARHAN (acute kidney injury) ([facility] 10/04/2019    Arthritis     Chronic back pain     CRF (chronic renal failure)     Hypertension     Renal insufficiency     stage 4    Thyroid disease         Past Surgical History:     Past Surgical History:   Procedure Laterality Date    BACK SURGERY      HERNIA REPAIR      JOINT REPLACEMENT Bilateral knees    SHOULDER SURGERY Right     THYROIDECTOMY      ULNAR TUNNEL RELEASE      UPPER GASTROINTESTINAL ENDOSCOPY  7/11/14    UPPER GASTROINTESTINAL ENDOSCOPY N/A 9/19/2019    EGD BIOPSY performed by Juan A Ibanez MD at [facility]        Medications Prior to Admission:     Prior to Admission medications    Medication Sig Start Date End Date Taking? Authorizing Provider   ondansetron (ZOFRAN) 4 MG tablet Take 1 tablet by mouth 3 times daily as needed for Nausea or Vomiting 8/14/20   Paula Perez MD   pravastatin (PRAVACHOL) 20 MG tablet TAKE 1 TABLET DAILY 8/10/20   Paula Perez MD   levothyroxine (SYNTHROID) 100 MCG tablet TAKE 1 TABLET DAILY 7/22/20   Paula Perez MD   clopidogrel (PLAVIX) 75 MG tablet TAKE 1 TABLET DAILY RESUME IN 3 TO 4 DAYS AS DIRECTED 5/5/20   Paula Perez MD   pantoprazole (PROTONIX) 40 MG tablet TAKE 1 TABLET TWICE A DAY BEFORE MEALS 4/1/20   Paula Perez MD   [facility]) [facility] 1 manual wheelchair 12/31/19   Paula Perez MD   senna (SENOKOT) 8.6 MG tablet Take 1 tablet by mouth 2 times daily as needed for Constipation 10/5/19 10/4/20  Paula Perez MD   Leuprolide Acetate (ELIGARD SC) Inject into the skin Twice yearly at MD office    Historical Provider, MD   traMADol (ULTRAM) 50 MG tablet Take 1 tablet by mouth every 8 hours as needed for Pain. 4/27/18   Historical Provider, MD   tiZANidine (ZANAFLEX) 2 MG tablet Take 2 mg by mouth every 6 hours as needed (muscle spasm)     Historical Provider, MD   amLODIPine (NORVASC) 2.5 MG tablet Take 1 tablet by mouth daily 10/5/15   Historical Provider, MD   tamsulosin (FLOMAX) 0.4 MG capsule Take 1 capsule by mouth daily. 2/19/15   Historical Provider, MD   lisinopril (PRINIVIL;ZESTRIL) 20 MG tablet Take 20 mg by mouth daily. Historical Provider, MD   aspirin 81 MG tablet Take 81 mg by mouth daily. Historical Provider, MD        Allergies:     Ciprofloxacin; Dilantin  [phenytoin]; Levaquin  [levofloxacin in d5w]; Levofloxacin; Levofloxacin; Macrobid  [nitrofurantoin macrocrystal]; Other; Sulfamethoxazole-trimethoprim; Tetracycline hcl; and Tetracyclines & related    Social History:     Tobacco:    reports that he has never smoked. He has never used smokeless tobacco.  Alcohol:      reports no history of alcohol use. Drug Use:  reports no history of drug use. Family History:     Family History   Problem Relation Age of Onset    Cancer Father        Review of Systems:     Positive and Negative as described in HPI. Review of Systems   Constitutional: Negative. HENT: Negative. Eyes: Negative. Respiratory: Negative. Cardiovascular: Negative. Gastrointestinal: Positive for nausea. Negative for abdominal distention, abdominal pain, anal bleeding, blood in stool, constipation, diarrhea, rectal pain and vomiting. Endocrine: Negative. Genitourinary: Positive for flank pain. Negative for decreased urine volume, difficulty urinating, discharge, dysuria, enuresis, frequency, genital sores, hematuria, penile pain, penile swelling, scrotal swelling, testicular pain and urgency. Musculoskeletal: Negative for arthralgias, back pain, gait problem, joint swelling, myalgias, neck pain and neck stiffness. Skin: Negative. Allergic/Immunologic: Negative. Neurological: Positive for dizziness. Negative for tremors, seizures, syncope, facial asymmetry, speech difficulty, weakness, light-headedness, numbness and headaches. Hematological: Negative. Psychiatric/Behavioral: Negative.         Physical Exam:   BP (!) 177/71   Pulse 90   Temp 98.1 °F (36.7 °C) (Oral)   Resp 17   Ht 6' (1.829 m)   Wt 166 lb (75.3 kg)   SpO2 96%   BMI 22.51 kg/m²   Temp (24hrs), Av.4 °F (36.9 °C), Min:98.1 °F (36.7 °C), Max:98.6 °F (37 °C)    No results for input(s): POCGLU in the last 72 hours. Intake/Output Summary (Last 24 hours) at 8/23/2020 0242  Last data filed at 8/22/2020 2322  Gross per 24 hour   Intake 1000 ml   Output --   Net 1000 ml       Physical Exam  Vitals signs and nursing note reviewed. Constitutional:       General: He is not in acute distress. Appearance: Normal appearance. He is normal weight. He is ill-appearing. He is not toxic-appearing or diaphoretic. HENT:      Head: Normocephalic and atraumatic. Right Ear: External ear normal.      Left Ear: External ear normal.      Nose: Nose normal. No congestion or rhinorrhea. Mouth/Throat:      Mouth: Mucous membranes are dry. Eyes:      General: No scleral icterus. Right eye: No discharge. Left eye: No discharge. Extraocular Movements: Extraocular movements intact. Conjunctiva/sclera: Conjunctivae normal.      Pupils: Pupils are equal, round, and reactive to light. Neck:      Musculoskeletal: Normal range of motion and neck supple. No neck rigidity or muscular tenderness. Vascular: No carotid bruit. Cardiovascular:      Rate and Rhythm: Normal rate and regular rhythm. Pulses: Normal pulses. Heart sounds: Normal heart sounds. No murmur. No friction rub. No gallop. Pulmonary:      Effort: Pulmonary effort is normal. No respiratory distress. Breath sounds: Normal breath sounds. No stridor. No wheezing, rhonchi or rales. Chest:      Chest wall: No tenderness. Abdominal:      General: Bowel sounds are normal. There is no distension. Palpations: Abdomen is soft. There is no mass. Tenderness: There is no abdominal tenderness. There is no guarding or rebound. Hernia: No hernia is present. Musculoskeletal:         General: No swelling, tenderness, deformity or signs of injury. Right lower leg: No edema. Left lower leg: No edema.    Lymphadenopathy:      Cervical: No cervical adenopathy. Skin:     General: Skin is warm and dry. Capillary Refill: Capillary refill takes less than 2 seconds. Coloration: Skin is pale. Skin is not jaundiced. Findings: No bruising, erythema, lesion or rash. Neurological:      General: No focal deficit present. Mental Status: He is alert and oriented to person, place, and time. Sensory: No sensory deficit. Motor: No weakness. Coordination: Coordination normal.   Psychiatric:         Mood and Affect: Mood normal.         Behavior: Behavior normal.         Investigations:      Laboratory Testing:  Recent Results (from the past 24 hour(s))   Urinalysis    Collection Time: 08/22/20  9:05 PM   Result Value Ref Range    Color, UA YELLOW YELLOW    Turbidity UA CLEAR CLEAR    Glucose, Ur NEGATIVE NEGATIVE    Bilirubin Urine NEGATIVE NEGATIVE    Ketones, Urine NEGATIVE NEGATIVE    Specific Gravity, UA 1.020 1.005 - 1.030    Urine Hgb TRACE (A) NEGATIVE    pH, UA 6.5 5.0 - 8.0    Protein, UA 2+ (A) NEGATIVE    Urobilinogen, Urine Normal Normal    Nitrite, Urine NEGATIVE NEGATIVE    Leukocyte Esterase, Urine NEGATIVE NEGATIVE    Urinalysis Comments NOT REPORTED    Microscopic Urinalysis    Collection Time: 08/22/20  9:05 PM   Result Value Ref Range    -          WBC, UA 0 TO 2 0 - 5 /HPF    RBC, UA 10 TO 20 0 - 2 /HPF    Casts UA NOT REPORTED /LPF    Casts UA NOT REPORTED /LPF    Crystals, UA NOT REPORTED None /HPF    Epithelial Cells UA 0 TO 2 0 - 5 /HPF    Renal Epithelial, UA NOT REPORTED 0 /HPF    Bacteria, UA NOT REPORTED None    Mucus, UA 1+ (A) None    Trichomonas, UA NOT REPORTED None    Amorphous, UA NOT REPORTED None    Other Observations UA NOT REPORTED NOT REQ.     Yeast, UA NOT REPORTED None   CBC Auto Differential    Collection Time: 08/22/20  9:11 PM   Result Value Ref Range    WBC 7.8 3.5 - 11.3 k/uL    RBC 2.65 (L) 4.21 - 5.77 m/uL    Hemoglobin 8.2 (L) 13.0 - 17.0 g/dL    Hematocrit 26.2 (L) 40.7 - 50.3 %    MCV 98.9 82.6 - 102.9 fL    MCH 30.9 25.2 - 33.5 pg    MCHC 31.3 28.4 - 34.8 g/dL    RDW 14.0 11.8 - 14.4 %    Platelets 722 580 - 552 k/uL    MPV 10.0 8.1 - 13.5 fL    NRBC Automated 0.0 0.0 per 100 WBC    Differential Type NOT REPORTED     Seg Neutrophils 61 36 - 65 %    Lymphocytes 21 (L) 24 - 43 %    Monocytes 10 3 - 12 %    Eosinophils % 5 (H) 1 - 4 %    Basophils 1 0 - 2 %    Immature Granulocytes 2 (H) 0 %    Segs Absolute 4.81 1.50 - 8.10 k/uL    Absolute Lymph # 1.59 1.10 - 3.70 k/uL    Absolute Mono # 0.81 0.10 - 1.20 k/uL    Absolute Eos # 0.36 0.00 - 0.44 k/uL    Basophils Absolute 0.05 0.00 - 0.20 k/uL    Absolute Immature Granulocyte 0.15 0.00 - 0.30 k/uL    WBC Morphology NOT REPORTED     RBC Morphology NOT REPORTED     Platelet Estimate NOT REPORTED    Basic Metabolic Panel    Collection Time: 08/22/20  9:11 PM   Result Value Ref Range    Glucose 128 (H) 70 - 99 mg/dL    BUN 32 (H) 8 - 23 mg/dL    CREATININE 2.34 (H) 0.70 - 1.20 mg/dL    Bun/Cre Ratio 14 9 - 20    Calcium 8.6 8.6 - 10.4 mg/dL    Sodium 143 135 - 144 mmol/L    Potassium 3.9 3.7 - 5.3 mmol/L    Chloride 106 98 - 107 mmol/L    CO2 24 20 - 31 mmol/L    Anion Gap 13 9 - 17 mmol/L    GFR Non-African American 26 (L) >60 mL/min    GFR  32 (L) >60 mL/min    GFR Comment          GFR Staging NOT REPORTED    Lipase    Collection Time: 08/22/20  9:11 PM   Result Value Ref Range    Lipase 18 13 - 60 U/L   Hepatic Function Panel    Collection Time: 08/22/20  9:11 PM   Result Value Ref Range    Alb 3.5 3.5 - 5.2 g/dL    Alkaline Phosphatase 1,174 (H) 40 - 129 U/L    ALT 8 5 - 41 U/L    AST 16 <40 U/L    Total Bilirubin 0.40 0.3 - 1.2 mg/dL    Bilirubin, Direct 0.10 <0.31 mg/dL    Bilirubin, Indirect 0.30 0.00 - 1.00 mg/dL    Total Protein 5.8 (L) 6.4 - 8.3 g/dL    Globulin NOT REPORTED 1.5 - 3.8 g/dL    Albumin/Globulin Ratio NOT REPORTED 1.0 - 2.5   TROP/MYOGLOBIN    Collection Time: 08/22/20  9:11 PM   Result Value Ref Range    Troponin, High Sensitivity 123 (HH) 0 - 22 ng/L    Troponin T NOT REPORTED <0.03 ng/mL    Troponin Interp NOT REPORTED     Myoglobin 138 (H) 28 - 72 ng/mL   Magnesium    Collection Time: 08/22/20  9:11 PM   Result Value Ref Range    Magnesium 1.7 1.6 - 2.6 mg/dL   Phosphorus    Collection Time: 08/22/20  9:11 PM   Result Value Ref Range    Phosphorus 3.6 2.5 - 4.5 mg/dL   T4    Collection Time: 08/22/20  9:11 PM   Result Value Ref Range    T4, Total 6.4 4.5 - 10.9 ug/dL   TSH without Reflex    Collection Time: 08/22/20  9:11 PM   Result Value Ref Range    TSH 8.42 (H) 0.30 - 5.00 mIU/L   EKG 12 Lead    Collection Time: 08/22/20  9:11 PM   Result Value Ref Range    Ventricular Rate 86 BPM    Atrial Rate 86 BPM    P-R Interval 168 ms    QRS Duration 98 ms    Q-T Interval 392 ms    QTc Calculation (Bazett) 469 ms    P Axis 57 degrees    R Axis -9 degrees    T Axis 49 degrees   TROP/MYOGLOBIN    Collection Time: 08/22/20 10:45 PM   Result Value Ref Range    Troponin, High Sensitivity 118 (HH) 0 - 22 ng/L    Troponin T NOT REPORTED <0.03 ng/mL    Troponin Interp NOT REPORTED     Myoglobin 133 (H) 28 - 72 ng/mL       Imaging/Diagnostics:  Ct Abdomen Pelvis Wo Contrast    Result Date: 8/22/2020  Question subtle haziness of the fat along the distal duodenum this could suggest underlying duodenitis or could be due to third-spacing of fluid. Please correlate with exam findings. Moderate severe sigmoid colonic diverticulosis. Scattered osteoblastic lesions involving the osseous structures. Cholelithiasis. Xr Shoulder Left (min 2 Views)    Result Date: 8/22/2020  Degenerate change. No evidence acute fracture dislocation     Xr Chest Portable    Result Date: 8/22/2020  No acute cardiopulmonary disease.        Assessment :      Hospital Problems           Last Modified POA    * (Principal) Dehydration 8/23/2020 Yes    Essential hypertension 8/23/2020 Yes    High cholesterol 8/23/2020 Yes    Hypothyroid 8/23/2020 Yes    CKD stage 4

## 2020-08-23 NOTE — PROGRESS NOTES
Comprehensive Nutrition Assessment    Type and Reason for Visit:  Initial, Positive Nutrition Screen    Nutrition Recommendations/Plan:   1. Continue cardiac diet  2. Start Ensure Enlive BID, Magic cup once daily  3. Assist at meal time, monitor swallowing/risk for aspiration    Nutrition Assessment:  Patient admission related to dehydration. Patient meets criteria for moderate malnutrition in the context of chronic illness. Per patient and patient wife, patient has a history of prostate cancer and was on oral chemotherapy, but stopped 3 weeks ago, since then he has eaten very little related to N/V and decreased sense of taste (pt. appears to have oral thrush upon exam), weight has remained stable. Noted pt. has swallowing difficulty with thin liquids, per pt. wife pt. usually takes medication with applesauce, RN notified. RD encouraged small frequent meals/snacks and encouraged pt. to eat despite decrease in taste. Will order Ensure Enlive BID and Magic Cup once daily and suggest patient have assistance/encouragement with feeding. Will monitor PO intake and nutrition status. Malnutrition Assessment:  Malnutrition Status: Moderate malnutrition    Context:  Chronic Illness     Findings of the 6 clinical characteristics of malnutrition:  Energy Intake:  7 - 75% or less estimated energy requirements for 1 month or longer  Weight Loss:  No significant weight loss     Body Fat Loss:  1 - Mild body fat loss Triceps, Fat Overlying Ribs   Muscle Mass Loss:  1 - Mild muscle mass loss Temples (temporalis)  Fluid Accumulation:  1 - Mild Extremities   Strength:  Not Performed    Estimated Daily Nutrient Needs:  Energy (kcal):  1446; Weight Used for Energy Requirements:  Admission     Protein (g):  83-98 g (1.1-1.3 g/kg);  Weight Used for Protein Requirements:  Admission        Fluid (ml/day):  1 mL/kcal; Weight Used for Fluid Requirements:  Admission      Nutrition Related Findings:  GI: WDL; Edema: +2 BUE; Skin: brusing; Oral thrush present on tounge      Wounds:  None       Current Nutrition Therapies:    DIET CARDIAC; Dietary Nutrition Supplements: Frozen Oral Supplement  Dietary Nutrition Supplements: Standard High Calorie Oral Supplement  Dietary Nutrition Supplements: Standard High Calorie Oral Supplement    Anthropometric Measures:  · Height: 6' (182.9 cm)  · Current Body Weight: 166 lb (75.3 kg)   · Admission Body Weight: 166 lb (75.3 kg)    · Usual Body Weight: 162 lb (73.5 kg)(6 months ago)     · Ideal Body Weight: 178 lbs; % Ideal Body Weight   93%  · BMI: 22.5  · Adjusted Body Weight:  ; No Adjustment   · BMI Categories: Normal Weight (BMI 22.0 to 24.9) age over 72       Nutrition Diagnosis:   · Moderate malnutrition, In context of chronic illness related to catabolic illness, inadequate protein-energy intake as evidenced by poor intake prior to admission, intake 0-25%, mild muscle loss, mild loss of subcutaneous fat      Nutrition Interventions:   Food and/or Nutrient Delivery:  Continue Current Diet, Start Oral Nutrition Supplement  Nutrition Education/Counseling:  Education not indicated   Coordination of Nutrition Care:  Continued Inpatient Monitoring, Feeding Assistance/Environment Change    Goals:  PO intakes to meet >50% estimated protein and energy needs       Nutrition Monitoring and Evaluation:   Behavioral-Environmental Outcomes:  Beliefs and Attitutes   Food/Nutrient Intake Outcomes:  Diet Advancement/Tolerance, Food and Nutrient Intake, Supplement Intake  Physical Signs/Symptoms Outcomes:  Chewing or Swallowing, Biochemical Data, Nausea or Vomiting, Weight     Discharge Planning:    Continue Oral Nutrition Supplement     Some areas of assessment may be incomplete due to COVID-19 precautions.     Mandie Chacon RDN, JUSTINN  RD Office Phone: [phone]

## 2020-08-23 NOTE — CARE COORDINATION
Entered room to discuss D/C plan and pt sleeping very soundly. Wife no longer at bedside. Admitted for dehydration with nausea and vomiting for several weeks. History of prostate caner with bone mets and follows with Dr. Mukul Sue. Oral chemo has been discontinued due to nausea and vomiting. Current with [facility] and had initial evaluation only. Jaz informed of admission. Also followed by Rei Chaudhari. Code status [id number]. Palliative care following.

## 2020-08-24 ENCOUNTER — APPOINTMENT (OUTPATIENT)
Dept: GENERAL RADIOLOGY | Age: 85
DRG: 640 | End: 2020-08-24
Payer: COMMERCIAL

## 2020-08-24 VITALS
HEART RATE: 99 BPM | BODY MASS INDEX: 22.51 KG/M2 | TEMPERATURE: 99 F | SYSTOLIC BLOOD PRESSURE: 167 MMHG | OXYGEN SATURATION: 95 % | HEIGHT: 72 IN | RESPIRATION RATE: 18 BRPM | WEIGHT: 166.2 LBS | DIASTOLIC BLOOD PRESSURE: 66 MMHG

## 2020-08-24 LAB
ANION GAP SERPL CALCULATED.3IONS-SCNC: 10 MMOL/L (ref 9–17)
BUN BLDV-MCNC: 26 MG/DL (ref 8–23)
BUN/CREAT BLD: 12 (ref 9–20)
CALCIUM SERPL-MCNC: 8.3 MG/DL (ref 8.6–10.4)
CHLORIDE BLD-SCNC: 109 MMOL/L (ref 98–107)
CO2: 23 MMOL/L (ref 20–31)
CREAT SERPL-MCNC: 2.14 MG/DL (ref 0.7–1.2)
EKG ATRIAL RATE: 86 BPM
EKG ATRIAL RATE: 87 BPM
EKG P AXIS: 57 DEGREES
EKG P AXIS: 70 DEGREES
EKG P-R INTERVAL: 168 MS
EKG P-R INTERVAL: 172 MS
EKG Q-T INTERVAL: 384 MS
EKG Q-T INTERVAL: 392 MS
EKG QRS DURATION: 102 MS
EKG QRS DURATION: 98 MS
EKG QTC CALCULATION (BAZETT): 462 MS
EKG QTC CALCULATION (BAZETT): 469 MS
EKG R AXIS: -9 DEGREES
EKG R AXIS: 41 DEGREES
EKG T AXIS: 47 DEGREES
EKG T AXIS: 49 DEGREES
EKG VENTRICULAR RATE: 86 BPM
EKG VENTRICULAR RATE: 87 BPM
GFR AFRICAN AMERICAN: 35 ML/MIN
GFR NON-AFRICAN AMERICAN: 29 ML/MIN
GFR SERPL CREATININE-BSD FRML MDRD: ABNORMAL ML/MIN/{1.73_M2}
GFR SERPL CREATININE-BSD FRML MDRD: ABNORMAL ML/MIN/{1.73_M2}
GLUCOSE BLD-MCNC: 113 MG/DL (ref 70–99)
POTASSIUM SERPL-SCNC: 4 MMOL/L (ref 3.7–5.3)
PROSTATE SPECIFIC ANTIGEN: 892 UG/L
SODIUM BLD-SCNC: 142 MMOL/L (ref 135–144)
TROPONIN INTERP: ABNORMAL
TROPONIN T: ABNORMAL NG/ML
TROPONIN, HIGH SENSITIVITY: 122 NG/L (ref 0–22)
TROPONIN, HIGH SENSITIVITY: 123 NG/L (ref 0–22)
TROPONIN, HIGH SENSITIVITY: 129 NG/L (ref 0–22)

## 2020-08-24 PROCEDURE — 99232 SBSQ HOSP IP/OBS MODERATE 35: CPT | Performed by: NURSE PRACTITIONER

## 2020-08-24 PROCEDURE — 36415 COLL VENOUS BLD VENIPUNCTURE: CPT

## 2020-08-24 PROCEDURE — 2500000003 HC RX 250 WO HCPCS: Performed by: FAMILY MEDICINE

## 2020-08-24 PROCEDURE — 71045 X-RAY EXAM CHEST 1 VIEW: CPT

## 2020-08-24 PROCEDURE — 80048 BASIC METABOLIC PNL TOTAL CA: CPT

## 2020-08-24 PROCEDURE — 84484 ASSAY OF TROPONIN QUANT: CPT

## 2020-08-24 PROCEDURE — 99239 HOSP IP/OBS DSCHRG MGMT >30: CPT | Performed by: FAMILY MEDICINE

## 2020-08-24 PROCEDURE — 6360000002 HC RX W HCPCS: Performed by: NURSE PRACTITIONER

## 2020-08-24 PROCEDURE — 6370000000 HC RX 637 (ALT 250 FOR IP): Performed by: NURSE PRACTITIONER

## 2020-08-24 PROCEDURE — 6370000000 HC RX 637 (ALT 250 FOR IP): Performed by: INTERNAL MEDICINE

## 2020-08-24 PROCEDURE — 93010 ELECTROCARDIOGRAM REPORT: CPT | Performed by: INTERNAL MEDICINE

## 2020-08-24 PROCEDURE — 6370000000 HC RX 637 (ALT 250 FOR IP): Performed by: FAMILY MEDICINE

## 2020-08-24 RX ORDER — HYDRALAZINE HYDROCHLORIDE 25 MG/1
25 TABLET, FILM COATED ORAL EVERY 12 HOURS SCHEDULED
Qty: 90 TABLET | Refills: 3 | Status: SHIPPED | OUTPATIENT
Start: 2020-08-24

## 2020-08-24 RX ORDER — AMOXICILLIN AND CLAVULANATE POTASSIUM 250; 62.5 MG/5ML; MG/5ML
250 POWDER, FOR SUSPENSION ORAL 2 TIMES DAILY
Qty: 100 ML | Refills: 0 | Status: SHIPPED | OUTPATIENT
Start: 2020-08-24 | End: 2020-09-03

## 2020-08-24 RX ORDER — HYDRALAZINE HYDROCHLORIDE 25 MG/1
25 TABLET, FILM COATED ORAL EVERY 12 HOURS SCHEDULED
Status: DISCONTINUED | OUTPATIENT
Start: 2020-08-24 | End: 2020-08-24 | Stop reason: HOSPADM

## 2020-08-24 RX ADMIN — PANTOPRAZOLE SODIUM 40 MG: 40 TABLET, DELAYED RELEASE ORAL at 15:15

## 2020-08-24 RX ADMIN — HYDROCODONE BITARTRATE AND ACETAMINOPHEN 1 TABLET: 5; 325 TABLET ORAL at 15:18

## 2020-08-24 RX ADMIN — POTASSIUM CHLORIDE, DEXTROSE MONOHYDRATE AND SODIUM CHLORIDE: 150; 5; 450 INJECTION, SOLUTION INTRAVENOUS at 09:53

## 2020-08-24 RX ADMIN — PRAVASTATIN SODIUM 20 MG: 20 TABLET ORAL at 09:53

## 2020-08-24 RX ADMIN — PANTOPRAZOLE SODIUM 40 MG: 40 TABLET, DELAYED RELEASE ORAL at 05:53

## 2020-08-24 RX ADMIN — HYDRALAZINE HYDROCHLORIDE 25 MG: 25 TABLET, FILM COATED ORAL at 09:53

## 2020-08-24 RX ADMIN — CLOPIDOGREL BISULFATE 75 MG: 75 TABLET ORAL at 09:53

## 2020-08-24 RX ADMIN — HEPARIN SODIUM 5000 UNITS: 5000 INJECTION INTRAVENOUS; SUBCUTANEOUS at 15:15

## 2020-08-24 RX ADMIN — HEPARIN SODIUM 5000 UNITS: 5000 INJECTION INTRAVENOUS; SUBCUTANEOUS at 05:54

## 2020-08-24 RX ADMIN — AMLODIPINE BESYLATE 10 MG: 10 TABLET ORAL at 09:53

## 2020-08-24 RX ADMIN — ASPIRIN 81 MG: 81 TABLET, COATED ORAL at 09:53

## 2020-08-24 RX ADMIN — LEVOTHYROXINE SODIUM 100 MCG: 0.1 TABLET ORAL at 05:54

## 2020-08-24 ASSESSMENT — PAIN SCALES - GENERAL
PAINLEVEL_OUTOF10: 4
PAINLEVEL_OUTOF10: 0
PAINLEVEL_OUTOF10: 3

## 2020-08-24 NOTE — PROGRESS NOTES
Physical Therapy  DATE: 2020    NAME: Raysa Phelps  MRN: [id number]   : 1929    Patient not seen this date for Physical Therapy due to:  [] Blood transfusion in progress  [] Cancel by RN  [] Hemodialysis  []  Refusal by Patient   [] Spine Precautions   [] Strict Bedrest  [] Surgery  [] Testing      [] Other        [] PT being discontinued at this time. Patient independent. No further needs. [x] PT being discontinued at this time as the patient has been transferred to hospice care. No further needs.     Nic Collins

## 2020-08-24 NOTE — PROGRESS NOTES
Time changed from 400 pm to [address]  [facility] [address] [facility] [address]

## 2020-08-24 NOTE — PLAN OF CARE
Problem: Falls - Risk of:  Goal: Will remain free from falls  Description: Will remain free from falls  8/24/2020 1246 by Tod Alvarado RN  Outcome: Ongoing  8/23/2020 2355 by Monica Persaud RN  Outcome: Ongoing  Goal: Absence of physical injury  Description: Absence of physical injury  8/24/2020 1246 by Tod Alvarado RN  Outcome: Ongoing  8/23/2020 2355 by Monica Persaud RN  Outcome: Ongoing     Problem: Skin Integrity:  Goal: Will show no infection signs and symptoms  Description: Will show no infection signs and symptoms  Outcome: Ongoing  Goal: Absence of new skin breakdown  Description: Absence of new skin breakdown  Outcome: Ongoing     Problem: Pain:  Goal: Pain level will decrease  Description: Pain level will decrease  Outcome: Ongoing  Goal: Control of acute pain  Description: Control of acute pain  Outcome: Ongoing  Goal: Control of chronic pain  Description: Control of chronic pain  Outcome: Ongoing  Goal: Patient's pain/discomfort is manageable  Description: Patient's pain/discomfort is manageable  Outcome: Ongoing     Problem: Safety:  Goal: Free from accidental physical injury  Description: Free from accidental physical injury  Outcome: Ongoing  Goal: Free from intentional harm  Description: Free from intentional harm  Outcome: Ongoing   PT/OT palliative care consult, IV fluids, labs

## 2020-08-24 NOTE — PROGRESS NOTES
Report called to [facility] and informed patients spouse on patients new room number.

## 2020-08-24 NOTE — PROGRESS NOTES
Patient discharged to [facility] with belongings per [place] transportation [address] [place], [place] and Code status given

## 2020-08-24 NOTE — PROGRESS NOTES
Occupational [facility]  Occupational Therapy Not Seen Note    Patient not available for Occupational Therapy due to:    [] Testing:    [] Hemodialysis    [] Cancelled by RN:    []Refusal by Patient:    [] Surgery:     [] Intubation:     [] Pain Medication:    [] Sedation:     [] Spine Precautions :    [] Medical Instability:    [x] Other: OT being discontinued at this time as the patient has been transferred to hospice care. No further needs.       Nadya Gamez, OTR/L

## 2020-08-24 NOTE — PROGRESS NOTES
Patient states he want to go inpatient Hospice instead of Home with Hospice now. Patient called wife to notify he states he is dizzy and not feeling well .  Writer called sheldon Shepherd from intake who states she will call [facility] Sees to notify her

## 2020-08-24 NOTE — DISCHARGE INSTR - COC
Continuity of Care Form    Patient Name: Betty Estrada   :  1929  MRN:  [id number]    Admit date:  2020  Discharge date:  ***    Code Status Order: DNR-CCA   Advance Directives:   Dimas Cruz 33 Directive Type of Healthcare Directive Copy in [address] Agent's Name Healthcare Agent's Phone Number    [phone]  Yes, patient has an advance directive for healthcare treatment  Durable power of  for health care;Living will  --  Spouse  Kayleigh Monroy  --          Admitting Physician:  Theodora Mathews MD  PCP: Mitzi Salas MD    Discharging Nurse: [facility] Unit/Room#:   Discharging Unit Phone Number: ***    Emergency Contact:   Extended Emergency Contact Information  Primary Emergency Contact: Rolley Cogan  Address: [address] [place] [address] [address]  Phone: [phone]  Work Phone: [phone]  Mobile Phone: [phone]  Relation: Spouse  Secondary Emergency Contact: Juan [facility] Phone: [phone]  Relation: Child    Past Surgical History:  Past Surgical History:   Procedure Laterality Date    BACK SURGERY      HERNIA REPAIR      JOINT REPLACEMENT Bilateral knees    SHOULDER SURGERY Right     THYROIDECTOMY      ULNAR TUNNEL RELEASE      UPPER GASTROINTESTINAL ENDOSCOPY  14    UPPER GASTROINTESTINAL ENDOSCOPY N/A 2019    EGD BIOPSY performed by Jill Oquendo MD at [address] [place] [address]       Immunization History:   Immunization History   Administered Date(s) Administered    Influenza Vaccine, unspecified formulation 10/20/2016    Influenza Whole 10/19/2015    Influenza, High Dose (Fluzone 65 yrs and older) 10/19/2017, 2018    Influenza, Quadv, IM, PF (6 mo and older Fluzone, Flulaval, Fluarix, and 3 yrs and older Afluria) 2019    Pneumococcal Conjugate 13-valent (Prbibth64) 2015    Pneumococcal Polysaccharide (Ierpgtuuy21) 10/29/2013    Zoster Live (Zostavax) 2014, 2016       Active Problems:  Patient Active Problem List   Diagnosis Code    Essential hypertension I10    High cholesterol E78.00    Hypothyroid E03.9    CRF (chronic renal failure) N18.9    Gastroenteritis K52.9    ARF (acute renal failure) (HCC) N17.9    Severe anemia D64.9    CKD stage 4 secondary to hypertension (HCC) I12.9, N18.4    S/P TAVR (transcatheter aortic valve replacement) Z95.2    Anemia D64.9    Weight loss R63.4    Other fatigue R53.83    Weakness R53.1    Duodenitis K29.80    Esophagitis K20.9    Large hiatal hernia K44.9    Acute kidney injury superimposed on chronic kidney disease (HCC) N17.9, N18.9    Nausea vomiting and diarrhea R11.2, R19.7    Diarrhea R19.7    Hiatus hernia syndrome K44.9    Moderate malnutrition ([facility]) E44.0    Dehydration E86.0    Acute on chronic diastolic congestive heart failure, NYHA class 3 (formerly [facility]) I50.33    Adenocarcinoma of prostate ([facility] [address] C61       Isolation/Infection:   Isolation          No Isolation        Patient Infection Status     None to display          Nurse Assessment:  Last Vital Signs: BP (!) 152/60   Pulse 87   Temp 97.9 °F (36.6 °C) (Oral)   Resp 18   Ht 6' (1.829 m)   Wt 166 lb 3.2 oz (75.4 kg)   SpO2 93%   BMI 22.54 kg/m²     Last documented pain score (0-10 scale): Pain Level: 6  Last Weight:   Wt Readings from Last 1 Encounters:   20 166 lb 3.2 oz (75.4 kg)     Mental Status:  {IP PT MENTAL STATUS:29450}    IV Access:  { JJ IV ACCESS:825502994}    Nursing Mobility/ADLs:  Walking   {CHP DME JIQN:547466831}  Transfer  {CHP DME URM}  Bathing  {CHP DME QKGR:000307079}  Dressing  {CHP DME WCBH:204602232}  Toileting  {CHP DME OPSL:268093827}  Feeding  {CHP DME BCRB:510834997}  Med Admin  {CHP DME CESN:277035903}  Med Delivery   { JJ MED Delivery:733358959}    Wound Care Documentation and Therapy: {Esignature:668700338}    PHYSICIAN SECTION    Prognosis: {Prognosis:8563514165}    Condition at Discharge: [address] Patient Condition:142795056}    Rehab Potential (if transferring to Rehab): {Prognosis:9492680453}    Recommended Labs or Other Treatments After Discharge: ***    Physician Certification: I certify the above information and transfer of Maia Laguerre  is necessary for the continuing treatment of the diagnosis listed and that he requires {Admit to Appropriate Level of Care:03771} for {GREATER/LESS:465874176} 30 days.      Update Admission H&P: {CHP DME Changes in JLGWI:587335167}    PHYSICIAN SIGNATURE:  {Esignature:354625956}

## 2020-08-24 NOTE — PROGRESS NOTES
Plavix and Protonix drug interaction:  Due to the possible risk for impaired clopidogrel effectiveness with this combination, carefully consider the need for concurrent pantoprazole therapy in patients receiving clopidogrel. Monitor response to clopidogrel closely when using clopidogrel with pantoprazole. Whether there are differences among individual proton pump inhibitors (PPI) is unclear. Other acid-lowering therapies (e.g. H2-receptor antagonists, antacids) do not appear to share this interaction with clopidogrel. Both Protonix and Plavix are home meds so I verified as ordered.

## 2020-08-24 NOTE — PROGRESS NOTES
Patient was transferred from [facility] Unit to [facility] Surgical unit into room 2026. Patient is alert / oriented, Patient was oriented to room, call light and bed mechanics. Patient side table is within reach as well as call light. Fall precautions are initiated as well as bed alarm on, fall sign placed outside the door. Will continue to hourly round on patient to check on patient's wellfare.

## 2020-08-24 NOTE — CARE COORDINATION
Pt qualifies for home Hospice and will admit at home today at [facility] and pt will be picked up at 4pm today by ambulance. Face sheet and medical necessity faxed.

## 2020-08-24 NOTE — DISCHARGE SUMMARY
[facility]    Discharge Summary     Patient ID: Pedrito Mortensen  :  1929   MRN: [id number]     ACCOUNT:  [de-identified]   Patient's PCP: Warden Cesar MD  Admit Date: 2020   Discharge Date: 2020     Length of Stay: 2  Code Status:  DNR-CCA  Admitting Physician: Kee Steward MD  Discharge Physician: Kee Steward MD     Active Discharge Diagnoses:     Hospital Problem Lists:  Principal Problem:    Dehydration  Active Problems:    Essential hypertension    High cholesterol    Hypothyroid    CKD stage 4 secondary to hypertension ([facility] [address]    S/P TAVR (transcatheter aortic valve replacement)    Anemia    Moderate malnutrition ([facility] [address]    Adenocarcinoma of prostate ([facility] [address]  Resolved Problems:    * No resolved hospital problems.  *      Admission Condition:  poor     Discharged Condition: Stable    Hospital Stay:     Hospital Course:      Kostas Hogue a 80 y.o. Non-/non  male who presents with Dehydration   and is admitted to the hospital for the management of Dehydration.   Per my NP\Patient presented to the emergency room with a chief complaint of nausea and inability to eat. [facility] has a history of prostate cancer and has been treated with chemotherapy which she recently ceased secondary to nausea and vomiting.  He called a nursing service available to him who directed him to go to the emergency department based on his symptoms.  Other medical history includes hypertension, hyperlipidemia, hypothyroidism, CKD, anemia, and status post [place] Patient sees Dr. Salgado Sees urology, Dr. Macy Bates for nephrology, and Dr. Perez Guaman for cardiology. pertinent lab findings include hemoglobin 8.2, creatinine 2.34, BUN 32, GFR 26, alk phos 1172, troponin 118, TSH 8.42.  The CT of the abdomen is suggestive of duodenitis and reveals moderate severe sigmoid colonic diverticulosis, along with scattered osteoblastic lesions involving the osseous structures.  He is being admitted for further evaluation and management of dehydration. During the admission patient was managed as follow    Metastatic adenocarcinoma of prostate: Patient was actively receiving chemotherapy currently on hold as patient is not tolerating the treatment well, patient did not initiate radiotherapy as he is getting weaker. Appreciate oncology recommendation regarding prognosis     Dehydration secondary to poor intake: Gentle hydration. Dysphagia: Patient at risk of aspiration. Developing aspiration pneumonia/pleural effusions: Patient was started on Augmentin, stop fluids.     CKD 4: Monitor creatinine and avoid nephrotoxic agents     Status post TAVR     Elevated troponin: Denies any chest pain, trend troponin cardiology consulted     Hypothyroidism continue home medication.     HTN: uncontrolled, continue home medications     HPL: continue home medications     Hard of hearing      DVT prophylaxis       Patient CODE STATUS was switched to DNR CCA by palliative care, hospice was involved, patient refused to go inpatient hospice and preferred to go home patient is going home with hospice     Discussed in detail and spent 30+ minutes     Significant therapeutic interventions:     Significant Diagnostic Studies:     Radiology:  Ct Abdomen Pelvis Wo Contrast    Result Date: 8/22/2020  Question subtle haziness of the fat along the distal duodenum this could suggest underlying duodenitis or could be due to third-spacing of fluid. Please correlate with exam findings. Moderate severe sigmoid colonic diverticulosis. Scattered osteoblastic lesions involving the osseous structures. Cholelithiasis. Xr Shoulder Left (min 2 Views)    Result Date: 8/22/2020  Degenerate change.   No evidence acute fracture dislocation     Xr Chest Portable    Result Date: 8/24/2020  Interval development of bibasilar parenchymal opacities, atelectasis versus infiltrates, associated with trace bilateral pleural effusions. Xr Chest Portable    Result Date: 8/22/2020  No acute cardiopulmonary disease. Consultations:    Consults:     Final Specialist Recommendations/Findings:   IP CONSULT TO INTERNAL MEDICINE  IP CONSULT TO CARDIOLOGY  IP CONSULT TO ONCOLOGY  IP CONSULT TO PALLIATIVE CARE  IP CONSULT TO HOSPICE      The patient was seen and examined on day of discharge     Vitals signs and nursing note reviewed. Constitutional:       General: He is not in acute distress. Appearance: He is chronically ill  HENT:      Head: Normocephalic and atraumatic. Mouth/Throat:      Mouth: Mucous membranes are moist  Eyes:      Conjunctiva/sclera: Conjunctivae normal.      Pupils: Pupils are equal, round, and reactive to light. Cardiovascular:      Rate and Rhythm: Normal rate and regular rhythm. Heart sounds: No murmur. Pulmonary:      Effort: Pulmonary effort is normal. No accessory muscle usage or respiratory distress. Breath sounds: No stridor. Wheezing present. No decreased breath sounds, rhonchi or rales. Abdominal:      General: Bowel sounds are normal. There is no distension. Palpations: Abdomen is soft. Abdomen is not rigid. Tenderness: There is no abdominal tenderness. There is no guarding. Musculoskeletal:         General: No tenderness. Skin:     General: Skin is warm and dry. Findings: No erythema, lesion or rash. Neurological:      Mental Status: He is alert and oriented to person, place, and time. Cranial Nerves: No cranial nerve deficit. Motor: No seizure activity.    Psychiatric:         Speech: Speech normal.         Behavior: Behavior normal. Behavior is cooperative.        Discharge plan:     Disposition: Home with hospice  Physician Follow Up:     Terrence Mar MD  [address] [place] [address] MD Butch Mims 36  [address] [facility],[address]  [facility] [place]ryn Barber  [id number] [phone]             Requiring Further Evaluation/Follow Up POST HOSPITALIZATION/Incidental Findings:     Diet: regular diet ( as tolerated)     Activity: As tolerated    Instructions to Patient:     Discharge Medications:      Medication List      START taking these medications    amoxicillin-clavulanate 250-62.5 MG/5ML suspension  Commonly known as: Augmentin  Take 5 mLs by mouth 2 times daily for 10 days     hydrALAZINE 25 MG tablet  Commonly known as:  APRESOLINE  Take 1 tablet by mouth every 12 hours        CONTINUE taking these medications    amLODIPine 2.5 MG tablet  Commonly known as:  NORVASC     aspirin 81 MG tablet     clopidogrel 75 MG tablet  Commonly known as:  PLAVIX  TAKE 1 TABLET DAILY RESUME IN 3 TO 4 DAYS AS DIRECTED     ELIGARD SC     levothyroxine 100 MCG tablet  Commonly known as:  SYNTHROID  TAKE 1 TABLET DAILY     lisinopril 20 MG tablet  Commonly known as:  PRINIVIL;ZESTRIL     ondansetron 4 MG tablet  Commonly known as:  ZOFRAN  Take 1 tablet by mouth 3 times daily as needed for Nausea or Vomiting     pantoprazole 40 MG tablet  Commonly known as:  PROTONIX  TAKE 1 TABLET TWICE A DAY BEFORE MEALS     pravastatin 20 MG tablet  Commonly known as:  PRAVACHOL  TAKE 1 TABLET DAILY     senna 8.6 MG tablet  Commonly known as:  Senokot  Take 1 tablet by mouth 2 times daily as needed for Constipation     tamsulosin 0.4 MG capsule  Commonly known as:  FLOMAX     tiZANidine 2 MG tablet  Commonly known as:  ZANAFLEX     traMADol 50 MG tablet  Commonly known as:  ULTRAM     Wheelchair Misc  1 manual wheelchair           Where to Get Your Medications      These medications were sent to [address] [place] [address]    Phone:  [phone]   · amoxicillin-clavulanate 250-62.5 MG/5ML suspension  · hydrALAZINE 25 MG tablet         Discharge Procedure Orders   XR CHEST 1 VIEW   Standing Status: Future Standing Exp.  Date: 08/24/21 Time Spent on discharge is  32 mins in patient examination, evaluation, counseling as well as medication reconciliation, prescriptions for required medications, discharge plan and follow up. Electronically signed by   Julisa Dunn MD  8/24/2020  1:58 PM      Thank you Dr. Sun Foster MD for the opportunity to be involved in this patient's care.

## 2020-08-24 NOTE — CARE COORDINATION
Dr. Amy Neely rounding and order written for Hospice. Call to wife and she is agreeable to referral to Hospice [facility]. Spoke to Bryanna in intake and nurse will be here for evaluation at 10am and wife will be present.

## 2020-08-24 NOTE — PLAN OF CARE
Problem: Falls - Risk of:  Goal: Will remain free from falls  Description: Will remain free from falls  8/23/2020 2238 by Lisa Kaur RN  Outcome: Ongoing  8/23/2020 1645 by Jack Wallis RN  Outcome: Ongoing  Note: Siderails up x 2  Hourly rounding. Call light in reach. Instructed to call for assist before attempting out of bed. Remains free from falls and accidental injury at this time. Floor free from obstacles, and bed is locked and in lowest position. Adequate lighting provided. Bed alarm on. Fall sticker on wristband.  Fall Sign posted in doorway   Goal: Absence of physical injury  Description: Absence of physical injury  8/23/2020 2238 by Lisa Kaur RN  Outcome: Ongoing  8/23/2020 1645 by Jack Wallis RN  Outcome: Ongoing     Problem: Skin Integrity:  Goal: Will show no infection signs and symptoms  Description: Will show no infection signs and symptoms  8/23/2020 2238 by Lisa Kaur RN  Outcome: Ongoing  8/23/2020 1645 by Jack Wallis RN  Outcome: Ongoing  Goal: Absence of new skin breakdown  Description: Absence of new skin breakdown  8/23/2020 2238 by Lisa Kaur RN  Outcome: Ongoing  8/23/2020 [address] by Jack Wallis RN  Outcome: Ongoing  Note: Waffle mattress on bed and properly inflated   Q2 turn  Meiplex to coccyx       Problem: Pain:  Goal: Pain level will decrease  Description: Pain level will decrease  Outcome: Ongoing  Goal: Control of acute pain  Description: Control of acute pain  Outcome: Ongoing  Goal: Control of chronic pain  Description: Control of chronic pain  Outcome: Ongoing  Goal: Patient's pain/discomfort is manageable  Description: Patient's pain/discomfort is manageable  Outcome: Ongoing     Problem: Infection:  Goal: Will remain free from infection  Description: Will remain free from infection  Outcome: Ongoing     Problem: Safety:  Goal: Free from accidental physical injury  Description: Free from accidental physical injury  Outcome: Ongoing  Goal: Free from intentional harm  Description: Free from intentional harm  Outcome: Ongoing     Problem: Daily Care:  Goal: Daily care needs are met  Description: Daily care needs are met  Outcome: Ongoing     Problem: Skin Integrity:  Goal: Skin integrity will stabilize  Description: Skin integrity will stabilize  Outcome: Ongoing     Problem: Discharge Planning:  Goal: Patients continuum of care needs are met  Description: Patients continuum of care needs are met  Outcome: Ongoing     Problem: Nutrition  Goal: Optimal nutrition therapy  Outcome: Ongoing

## 2020-08-24 NOTE — PROGRESS NOTES
Palliative Care Progress Note    NAME:  Almena Shell  MEDICAL RECORD NUMBER:  [id number]  AGE: 80 y.o. GENDER: male  : 1929  TODAY'S DATE:  2020    Reason for Consult:  goals of care and hospice discussion  History of Present Illness     The patient is a 80 y.o. Non-/non  male who presents with Dehydration      Referred to Palliative Care by  [x] Physician   [] Nursing  [] Family Request   [] Other:       He was admitted to the primary service for Dehydration [E86.0]. His hospital course has been associated with Dehydration, decreased appetite, nausea, and vomiting. The patient has a complicated medical history and has been hospitalized since 2020  8:45 PM. Tracy Loo is a 80year old male. He has a history of prostate cancer and was recently taken off of oral Chemotherapy due to nausea, vomiting, weakness and fatigue. Yesterday Palliative care had discussion about code status with wife and patient and they agreed to change code status to a DNRCC-A no intubation. Dr Justin Quintana spoke with patient. Patient is not a candidate for Xofigo due to his anemia and patient does not want palliative radiation. Hospice consult was ordered for patient. Palliative care will continue to follow patient and provide emotional support to patient and family.     OVERNIGHT EVENTS:  DNRCC-A no intubation  Hopice consult with [facility] hospice at home      BP (!) 176/70   Pulse 93   Temp 98.1 °F (36.7 °C) (Oral)   Resp 18   Ht 6' (1.829 m)   Wt 166 lb 3.2 oz (75.4 kg)   SpO2 97%   BMI 22.54 kg/m²     Assessment        REVIEW OF SYSTEMS    []   UNABLE TO OBTAIN:     Constitutional:  []   Chills   [x]  Fatigue   []  Fevers   [x]  Malaise   [x]  Weight loss   [x] Other: decreased appetite     Respiratory:   []  Cough    []  Shortness of breath    []  Chest pain    [x] Other: denies SOB or cough  Cardiovascular:   []  Chest pain  []  Dyspnea    []  Exertional chest pressure/discomfort     [] Fatigue      [] Palpitations    []  Syncope   [x] Other: Denies CP, pressure, and BLE edema   Gastrointestinal:   []  Abdominal pain   []  Constipation    []  Diarrhea    []   Dysphagia   []  Reflux             [x]  Vomiting and nausea    [x] Other: decreased appetite   Genitourinary:  []  Dysuria     []  Frequency   []  Hematuria   [] Nocturia   []  Urinary incontinence   [x] Other: patient uses a urinal   Musculoskeletal:   [] Back pain    [x]  Muscle weakness   [x]  Myalgias    []  Neck pain   []  Stiff joints   []  Other: generalized weakness     Behavioral/Psych:   [] Anxiety    []  Depression     []  Mood swings   [] Other:     PHYSICAL ASSESSMENT:     General: [x]  Oriented x3      [] well appearing      [] Intubated      [] ill appearing      [] Other:    Mental Status: [] normal mental status exam      [x] drowsy      [] Confused      [] Other:     Cardiovascular: [x]  Regular rate/rhythm      [] Arrhythmia      [x] Other: No BLE edema   Chest: [] Effort normal      [] lungs clear      [] respiratory distress      [] Tachypnea      [x]  Other:Lungs diminished respirations relaxed   Abdomen: [] Soft/non-tender      []  Normal appearance      [] Distended      [] Ascites      [x] Other: nausea, vomiting, decreased appetite   Neurological: [x] Normal Speech      [] Normal Sensation      []  Deficits present:      Extremity:  [] normal skin color/temp      [] clubbing/cyanosis      [x]  No edema      [] Other:     Palliative Performance Scale:  ___60%  Ambulation reduced; Significant disease; Can't do hobbies/housework; intake normal or reduced; occasional assist; LOC full/confusion  ___50%  Mainly sit/lie; Extensive disease; Can't do any work; Considerable assist; intake normal or reduced; LOC full/confusion  x___40%  Mainly in bed; Extensive disease; Mainly assist; intake normal or reduced; LOC full/confusion   ___30%  Bed Bound; Extensive disease; Total care; intake reduced; LOCfull/confusion  ___20%  Bed Bound;  Extensive disease; Total care; intake minimal; Drowsy/coma  ___10%  Bed Bound; Extensive disease; Total care; Mouth care only; Drowsy/coma  ___0       Death      Plan      Palliative Interaction:  Went to follow up with patient and wife. They were having a meeting with [facility] [address] [place] [address] for at home hospice. Patient wife Layo Cain provided me with a copy of patient DPOA and Living will and I copied it and placed a copy in patient chart. Patient was ordered [facility] as needed for pain in case Tylenol was ineffective. Hospice discussing program with patient and his wife. I will follow up later with patient. Education/support to family  Education/support to patient  Discharge planning/helping to coordinate care  Communications with primary service  Pharmacologic pain management  Discussing meaning/purpose   Caregiver support/education  Advanced directive completed copied and placed on chart   Code status clarified: [facility]  Provided information about hospice  Principle Problem/Diagnosis:  Dehydration    Additional Assessments:    1- Symptom management/ pain control     Pain Assessment:  Pain is controlled with current analgesics. Medication(s) being used: acetaminophen, narcotic analgesics including Norco.               Anxiety:  none                          Dyspnea:  none                          Fatigue:  generalized weakness     Other:      2- Goals of care evaluation   The patient goals of care are provide comfort care/support/palliation/relieve suffering   Goals of care discussed with:    [] Patient independently    [x] Patient and Family    [] Family or Healthcare DPOA independently    [] Unable to discuss with patient, family/DPOA not present    3- Code Status  DNR-CCA    4- Other recommendations  - We will continue to provide comfort and support to the patient and the family        Palliative Care will continue to follow Mr. Mery Gonzalez care as needed.     The note has been dictated by dragon, typing errors may be a possibility     Thank you for allowing Palliative Care to participate in the care of Mr. Gopal Herbert . Electronically signed by   MILKA Valenzuela NP  Palliative Care Team  on 8/24/2020 at 11:59 AM    Please call with any palliative questions or concerns. Palliative Care Team is available via perfect serve or via phone.

## 2020-09-09 ENCOUNTER — TELEPHONE (OUTPATIENT)
Dept: INTERNAL MEDICINE CLINIC | Age: 85
End: 2020-09-09

## 2020-09-09 NOTE — TELEPHONE ENCOUNTER
Pt called to request order for home care services. Pt wants to use [facility] ZD#[phone]    Johny Baker to place order?     Last seen 6/23/2020  next appt 12/16/2020

## 2020-09-10 NOTE — TELEPHONE ENCOUNTER
Referral placed, pt was informed. Call to [facility], phone rings with no answer and no machine to LM. Pt was informed of this and will try to get fax# and call back. I will try again also.

## 2020-09-15 NOTE — TELEPHONE ENCOUNTER
Spoke with [facility], states they do not accept [facility]. LM asking pt if he would like to use [facility]?

## 2020-09-22 PROBLEM — E86.0 DEHYDRATION: Status: RESOLVED | Noted: 2020-08-22 | Resolved: 2020-09-22

## 2020-09-29 RX ORDER — PANTOPRAZOLE SODIUM 40 MG/1
TABLET, DELAYED RELEASE ORAL
Qty: 180 TABLET | Refills: 1 | Status: SHIPPED | OUTPATIENT
Start: 2020-09-29

## 2020-11-09 ENCOUNTER — TELEPHONE (OUTPATIENT)
Dept: INTERNAL MEDICINE CLINIC | Age: 85
End: 2020-11-09

## (undated) DEVICE — GAUZE,SPONGE,4"X4",16PLY,STRL,LF,10/TRAY: Brand: MEDLINE

## (undated) DEVICE — CUP MED 1OZ CLR POLYPR FEED GRAD W/O LID

## (undated) DEVICE — FORCEPS BX L240CM JAW DIA2.4MM ORNG L CAP W/ NDL DISP RAD

## (undated) DEVICE — BLOCK BITE 60FR RUBBER ADLT DENTAL